# Patient Record
Sex: FEMALE | Race: WHITE | NOT HISPANIC OR LATINO | Employment: STUDENT | URBAN - METROPOLITAN AREA
[De-identification: names, ages, dates, MRNs, and addresses within clinical notes are randomized per-mention and may not be internally consistent; named-entity substitution may affect disease eponyms.]

---

## 2017-01-09 ENCOUNTER — OFFICE VISIT (OUTPATIENT)
Dept: OBSTETRICS AND GYNECOLOGY | Facility: CLINIC | Age: 22
End: 2017-01-09
Payer: COMMERCIAL

## 2017-01-09 VITALS
SYSTOLIC BLOOD PRESSURE: 118 MMHG | WEIGHT: 153 LBS | HEIGHT: 66 IN | BODY MASS INDEX: 24.59 KG/M2 | DIASTOLIC BLOOD PRESSURE: 72 MMHG

## 2017-01-09 DIAGNOSIS — Z01.419 WELL WOMAN EXAM WITH ROUTINE GYNECOLOGICAL EXAM: Primary | ICD-10-CM

## 2017-01-09 PROCEDURE — 99999 PR PBB SHADOW E&M-EST. PATIENT-LVL II: CPT | Mod: PBBFAC,,, | Performed by: OBSTETRICS & GYNECOLOGY

## 2017-01-09 PROCEDURE — 88175 CYTOPATH C/V AUTO FLUID REDO: CPT

## 2017-01-09 PROCEDURE — 99395 PREV VISIT EST AGE 18-39: CPT | Mod: S$GLB,,, | Performed by: OBSTETRICS & GYNECOLOGY

## 2017-01-09 RX ORDER — NORGESTIMATE AND ETHINYL ESTRADIOL 0.25-0.035
1 KIT ORAL DAILY
Qty: 84 TABLET | Refills: 4 | Status: SHIPPED | OUTPATIENT
Start: 2017-01-09 | End: 2018-01-12 | Stop reason: SDUPTHER

## 2017-01-09 RX ORDER — METRONIDAZOLE 500 MG/1
500 TABLET ORAL 3 TIMES DAILY
Qty: 15 TABLET | Refills: 2 | Status: SHIPPED | OUTPATIENT
Start: 2017-01-09 | End: 2018-02-09

## 2017-01-09 NOTE — MR AVS SNAPSHOT
[facility]  [address] [facility]  [address]th [address], [address]  [place] [address]  Phone: [phone]                  Donna Noel   2017 11:00 AM   Office Visit    Description:  Female : 1995   Provider:  Michael A. Wiedemann, MD   Department:  [facility]           Reason for Visit     Well Woman           Diagnoses this Visit        Comments    Well woman exam with routine gynecological exam    -  Primary            To Do List           Goals (5 Years of Data)     None       These Medications        Disp Refills Start End    SPRINTEC, 28, 0.25-35 mg-mcg per tablet 84 tablet 4 2017     Take 1 tablet by mouth once daily. - Oral    [id number] [place] [employer] Ph #: [phone]       metronidazole (FLAGYL) 500 MG tablet 15 tablet 2 2017     Take 1 tablet (500 mg total) by mouth 3 (three) times daily. - Oral    Pharmacy: [id number] [place] [employer] Ph #: [phone]         [facility]     [facility] Care Line -  Assistance  Registered nurses in the [facility] provide clinical advisement, health education, appointment booking, and other advisory services.  Call for this free service at [phone].             Medications           Message regarding Medications     Verify the changes and/or additions to your medication regime listed below are the same as discussed with your clinician today.  If any of these changes or additions are incorrect, please notify your healthcare provider.        START taking these NEW medications        Refills    metronidazole (FLAGYL) 500 MG tablet 2    Sig: Take 1 tablet (500 mg total) by mouth 3 (three) times daily.    Class: Normal    Route: Oral           Verify that the below list of medications is an accurate representation of the medications you are currently taking.  If none reported, the list may be blank. If incorrect, please contact  "your healthcare provider. Carry this list with you in case of emergency.           Current Medications     SPRINTEC, 28, 0.25-35 mg-mcg per tablet Take 1 tablet by mouth once daily.    metronidazole (FLAGYL) 500 MG tablet Take 1 tablet (500 mg total) by mouth 3 (three) times daily.           Clinical Reference Information           Vital Signs - Last Recorded  Most recent update: 1/9/2017 11:09 AM by Odalys Rosenberg LPN    BP Ht Wt LMP BMI    118/72 5' 6" (1.676 m) 69.4 kg (153 lb) 12/13/2016 (Exact Date) 24.69 kg/m2      Blood Pressure          Most Recent Value    BP  118/72      Allergies as of 1/9/2017     No Known Allergies      Immunizations Administered on Date of Encounter - 1/9/2017     None      Orders Placed During Today's Visit      Normal Orders This Visit    Liquid-based pap smear, screening       [facility] Sign-Up     Activating your [facility] account is as easy as 1-2-3!     1) Visit [email or web address], select Sign Up Now, enter this activation code and your date of birth, then select Next.  [email or web address]  Expires: 2/23/2017 11:20 AM      2) Create a username and password to use when you visit [facility] in the future and select a security question in case you lose your password and select Next.    3) Enter your e-mail address and click Sign Up!    Additional Information  If you have questions, please e-mail [email or web address] or call [phone] to talk to our [facility] staff. Remember, [facility] is NOT to be used for urgent needs. For medical emergencies, dial 911.         "

## 2017-01-09 NOTE — PROGRESS NOTES
"HPI:   21 y.o.   OB History      Para Term  AB TAB SAB Ectopic Multiple Living    0 0 0 0 0 0 0 0 0 0       Patient's last menstrual period was 2016 (exact date).    Patient is  here for her annual gynecologic exam.  She has no complaints.     ROS:  GENERAL: No fever, chills, fatigability or weight loss.  SKIN: No rashes, itching or changes in color or texture of skin.  HEAD: No headaches or recent head trauma.  EYES: Visual acuity fine. No photophobia, ocular pain or diplopia.  EARS: Denies ear pain, discharge or vertigo.  NOSE: No loss of smell, no epistaxis or postnasal drip.  MOUTH & THROAT: No hoarseness or change in voice. No excessive gum bleeding.  NODES: Denies swollen glands.  CHEST: Denies NOLAN, cyanosis, wheezing, cough and sputum production.  CARDIOVASCULAR: Denies chest pain, PND, orthopnea or reduced exercise tolerance.  ABDOMEN: Appetite fine. No weight loss. Denies diarrhea, abdominal pain, hematemesis or blood in stool.  URINARY: No flank pain, dysuria or hematuria.  PERIPHERAL VASCULAR: No claudication or cyanosis.  MUSCULOSKELETAL: No joint stiffness or swelling. Denies back pain.  NEUROLOGIC: No history of seizures, paralysis, alteration of gait or coordination.    PE:   Visit Vitals    /72    Ht 5' 6" (1.676 m)    Wt 69.4 kg (153 lb)    LMP 2016 (Exact Date)    BMI 24.69 kg/m2     APPEARANCE: Well nourished, well developed, in no acute distress.  NECK: Neck symmetric without masses or thyromegaly.  BREASTS: Symmetrical, no skin changes or visible lesions. No palpable masses, nipple discharge or adenopathy bilaterally.  ABDOMEN: Flat. Soft. No tenderness or masses. No hepatosplenomegaly. No hernias. No CVA tenderness.  VULVA: No lesions. Normal female genitalia.  URETHRAL MEATUS: Normal size and location, no lesions, no prolapse.  URETHRA: No masses, tenderness, prolapse or scarring.  VAGINA: Moist and well rugated, no discharge, no significant cystocele or " rectocele.  CERVIX: No lesions and discharge. PAP done.  UTERUS: Normal size, regular shape, mobile, non-tender, bladder base nontender.  ADNEXA: No masses, tenderness or CDS nodularity.  ANUS PERINEUM: Normal.    PROCEDURES:  Pap smear    Assessment:  Normal Gynecologic Exam    Plan:  Mammogram and Colonoscopy if indicated by current recommendations.  Return to clinic in one year or for any problems or complaints.  refil ocp  Trial flagyl for co dc, but always

## 2017-01-24 ENCOUNTER — TELEPHONE (OUTPATIENT)
Dept: OBSTETRICS AND GYNECOLOGY | Facility: CLINIC | Age: 22
End: 2017-01-24

## 2017-01-24 NOTE — TELEPHONE ENCOUNTER
Returned patients call.   Patients questions regarding taking flagyl, and the side effects of GI upset were answered and patient verbalized understanding.

## 2017-01-24 NOTE — TELEPHONE ENCOUNTER
----- Message from Angelic Humphrey sent at 1/24/2017 10:02 AM CST -----  Contact: Self [phone]  Patient is calling to talk to nurse concerning questions on the medication she is taking. Please advice

## 2017-05-11 ENCOUNTER — TELEPHONE (OUTPATIENT)
Dept: OBSTETRICS AND GYNECOLOGY | Facility: CLINIC | Age: 22
End: 2017-05-11

## 2017-05-11 RX ORDER — NAPROXEN SODIUM 550 MG/1
550 TABLET ORAL
Qty: 30 TABLET | Refills: 0 | Status: SHIPPED | OUTPATIENT
Start: 2017-05-11 | End: 2018-02-09

## 2017-05-11 NOTE — TELEPHONE ENCOUNTER
----- Message from Raisa Broussard sent at 5/11/2017 10:50 AM CDT -----  Contact: Self/[phone]  Patient said she would like to speak with you regarding painful cramp like pains. She has not had a period because she is on birth control. Please advise

## 2017-05-11 NOTE — TELEPHONE ENCOUNTER
----- Message from Lilliam Ramesh sent at 5/11/2017 11:31 AM CDT -----  No. [phone]   Patient returned your call.

## 2017-05-11 NOTE — TELEPHONE ENCOUNTER
----- Message from Dianna Amaya sent at 5/11/2017  3:25 PM CDT -----  Contact: [phone]/self  Patient is returning your call. Please advise

## 2017-05-11 NOTE — TELEPHONE ENCOUNTER
Pt says she started her period on Wednesday and she is having a lot of pain that she doesn't typically experience. Pt took ibuprofen and used a heating pad but it didn't help much. She says the flow is less than normal. I advised her that it sounds like a bad cycle and to let us know if the pain doesn't go away or if it returns with next months cycle. She would like a Rx for anaprox

## 2017-06-21 RX ORDER — CIPROFLOXACIN 500 MG/1
500 TABLET ORAL 2 TIMES DAILY
Qty: 10 TABLET | Refills: 0 | Status: SHIPPED | OUTPATIENT
Start: 2017-06-21 | End: 2017-06-26

## 2018-01-12 NOTE — TELEPHONE ENCOUNTER
----- Message from Michelle Mai sent at 1/12/2018  2:30 PM CST -----  Contact: [phone]/self  Patient requesting to speak with you regarding a refill for her birth control medication. Please call and advise.

## 2018-01-14 RX ORDER — NORGESTIMATE AND ETHINYL ESTRADIOL 0.25-0.035
1 KIT ORAL DAILY
Qty: 84 TABLET | Refills: 0 | Status: SHIPPED | OUTPATIENT
Start: 2018-01-14 | End: 2018-02-09 | Stop reason: SDUPTHER

## 2018-02-09 ENCOUNTER — OFFICE VISIT (OUTPATIENT)
Dept: OBSTETRICS AND GYNECOLOGY | Facility: CLINIC | Age: 23
End: 2018-02-09
Payer: COMMERCIAL

## 2018-02-09 VITALS
DIASTOLIC BLOOD PRESSURE: 80 MMHG | WEIGHT: 156.06 LBS | BODY MASS INDEX: 25.19 KG/M2 | SYSTOLIC BLOOD PRESSURE: 100 MMHG

## 2018-02-09 DIAGNOSIS — Z01.419 WELL WOMAN EXAM WITH ROUTINE GYNECOLOGICAL EXAM: Primary | ICD-10-CM

## 2018-02-09 DIAGNOSIS — Z12.4 ROUTINE PAPANICOLAOU SMEAR: ICD-10-CM

## 2018-02-09 PROCEDURE — 99395 PREV VISIT EST AGE 18-39: CPT | Mod: S$GLB,,, | Performed by: OBSTETRICS & GYNECOLOGY

## 2018-02-09 PROCEDURE — 88175 CYTOPATH C/V AUTO FLUID REDO: CPT

## 2018-02-09 PROCEDURE — 99999 PR PBB SHADOW E&M-EST. PATIENT-LVL II: CPT | Mod: PBBFAC,,, | Performed by: OBSTETRICS & GYNECOLOGY

## 2018-02-09 RX ORDER — NORGESTIMATE AND ETHINYL ESTRADIOL 0.25-0.035
1 KIT ORAL DAILY
Qty: 84 TABLET | Refills: 3 | Status: SHIPPED | OUTPATIENT
Start: 2018-02-09 | End: 2018-12-04 | Stop reason: SDUPTHER

## 2018-02-09 NOTE — PROGRESS NOTES
HPI:   22 y.o.   OB History      Para Term  AB Living    0 0 0 0 0 0    SAB TAB Ectopic Multiple Live Births    0 0 0 0         Patient's last menstrual period was 2018 (exact date).    Patient is  here for her annual gynecologic exam.  She has no complaints.     ROS:  GENERAL: No fever, chills, fatigability or weight loss.  SKIN: No rashes, itching or changes in color or texture of skin.  HEAD: No headaches or recent head trauma.  EYES: Visual acuity fine. No photophobia, ocular pain or diplopia.  EARS: Denies ear pain, discharge or vertigo.  NOSE: No loss of smell, no epistaxis or postnasal drip.  MOUTH & THROAT: No hoarseness or change in voice. No excessive gum bleeding.  NODES: Denies swollen glands.  CHEST: Denies NOLAN, cyanosis, wheezing, cough and sputum production.  CARDIOVASCULAR: Denies chest pain, PND, orthopnea or reduced exercise tolerance.  ABDOMEN: Appetite fine. No weight loss. Denies diarrhea, abdominal pain, hematemesis or blood in stool.  URINARY: No flank pain, dysuria or hematuria.  PERIPHERAL VASCULAR: No claudication or cyanosis.  MUSCULOSKELETAL: No joint stiffness or swelling. Denies back pain.  NEUROLOGIC: No history of seizures, paralysis, alteration of gait or coordination.    PE:   /80   Wt 70.8 kg (156 lb 1.4 oz)   LMP 2018 (Exact Date)   BMI 25.19 kg/m²   APPEARANCE: Well nourished, well developed, in no acute distress.  NECK: Neck symmetric without masses or thyromegaly.  BREASTS: Symmetrical, no skin changes or visible lesions. No palpable masses, nipple discharge or adenopathy bilaterally.  ABDOMEN: Flat. Soft. No tenderness or masses. No hepatosplenomegaly. No hernias. No CVA tenderness.  VULVA: No lesions. Normal female genitalia.  URETHRAL MEATUS: Normal size and location, no lesions, no prolapse.  URETHRA: No masses, tenderness, prolapse or scarring.  VAGINA: Moist and well rugated, no discharge, no significant cystocele or rectocele.  CERVIX:  No lesions and discharge. PAP done.  UTERUS: Normal size, regular shape, mobile, non-tender, bladder base nontender.  ADNEXA: No masses, tenderness or CDS nodularity.  ANUS PERINEUM: Normal.    PROCEDURES:  Pap smear    Assessment:  Normal Gynecologic Exam    Plan:  Mammogram and Colonoscopy if indicated by current recommendations.  Return to clinic in one year or for any problems or complaints.  Doing well on ocp

## 2018-12-04 RX ORDER — NORGESTIMATE AND ETHINYL ESTRADIOL 0.25-0.035
1 KIT ORAL DAILY
Qty: 84 TABLET | Refills: 0 | Status: SHIPPED | OUTPATIENT
Start: 2018-12-04 | End: 2023-10-18 | Stop reason: SDUPTHER

## 2019-01-31 RX ORDER — NORGESTIMATE AND ETHINYL ESTRADIOL 0.25-0.035
KIT ORAL
Qty: 84 TABLET | Refills: 0 | Status: SHIPPED | OUTPATIENT
Start: 2019-01-31 | End: 2019-03-28 | Stop reason: SDUPTHER

## 2019-03-12 ENCOUNTER — TELEPHONE (OUTPATIENT)
Dept: OBSTETRICS AND GYNECOLOGY | Facility: CLINIC | Age: 24
End: 2019-03-12

## 2019-03-12 NOTE — TELEPHONE ENCOUNTER
----- Message from Lilliam Ramesh sent at 3/12/2019  9:16 AM CDT -----  No. [phone]    Patient has an appointment this morning at 11:00.  She is on her cycle, but it is light.   Can she still come.     Please call.

## 2019-03-28 ENCOUNTER — OFFICE VISIT (OUTPATIENT)
Dept: OBSTETRICS AND GYNECOLOGY | Facility: CLINIC | Age: 24
End: 2019-03-28
Payer: COMMERCIAL

## 2019-03-28 VITALS
HEIGHT: 66 IN | BODY MASS INDEX: 25.08 KG/M2 | DIASTOLIC BLOOD PRESSURE: 74 MMHG | WEIGHT: 156.06 LBS | SYSTOLIC BLOOD PRESSURE: 108 MMHG

## 2019-03-28 DIAGNOSIS — Z12.4 ROUTINE PAPANICOLAOU SMEAR: Primary | ICD-10-CM

## 2019-03-28 DIAGNOSIS — Z01.419 WELL WOMAN EXAM WITH ROUTINE GYNECOLOGICAL EXAM: ICD-10-CM

## 2019-03-28 PROCEDURE — 99395 PR PREVENTIVE VISIT,EST,18-39: ICD-10-PCS | Mod: S$GLB,,, | Performed by: OBSTETRICS & GYNECOLOGY

## 2019-03-28 PROCEDURE — 99999 PR PBB SHADOW E&M-EST. PATIENT-LVL III: CPT | Mod: PBBFAC,,, | Performed by: OBSTETRICS & GYNECOLOGY

## 2019-03-28 PROCEDURE — 99999 PR PBB SHADOW E&M-EST. PATIENT-LVL III: ICD-10-PCS | Mod: PBBFAC,,, | Performed by: OBSTETRICS & GYNECOLOGY

## 2019-03-28 PROCEDURE — 99395 PREV VISIT EST AGE 18-39: CPT | Mod: S$GLB,,, | Performed by: OBSTETRICS & GYNECOLOGY

## 2019-03-28 PROCEDURE — 88175 CYTOPATH C/V AUTO FLUID REDO: CPT

## 2019-03-28 NOTE — PROGRESS NOTES
"HPI:   23 y.o.   OB History        0    Para   0    Term   0       0    AB   0    Living   0       SAB   0    TAB   0    Ectopic   0    Multiple   0    Live Births                  Patient's last menstrual period was 2019.    Patient is  here for her annual gynecologic exam.  She has no complaints.     ROS:  GENERAL: No fever, chills, fatigability or weight loss.  SKIN: No rashes, itching or changes in color or texture of skin.  HEAD: No headaches or recent head trauma.  EYES: Visual acuity fine. No photophobia, ocular pain or diplopia.  EARS: Denies ear pain, discharge or vertigo.  NOSE: No loss of smell, no epistaxis or postnasal drip.  MOUTH & THROAT: No hoarseness or change in voice. No excessive gum bleeding.  NODES: Denies swollen glands.  CHEST: Denies NOLAN, cyanosis, wheezing, cough and sputum production.  CARDIOVASCULAR: Denies chest pain, PND, orthopnea or reduced exercise tolerance.  ABDOMEN: Appetite fine. No weight loss. Denies diarrhea, abdominal pain, hematemesis or blood in stool.  URINARY: No flank pain, dysuria or hematuria.  PERIPHERAL VASCULAR: No claudication or cyanosis.  MUSCULOSKELETAL: No joint stiffness or swelling. Denies back pain.  NEUROLOGIC: No history of seizures, paralysis, alteration of gait or coordination.    PE:   /74   Ht 5' 6" (1.676 m)   Wt 70.8 kg (156 lb 1.4 oz)   LMP 2019   BMI 25.19 kg/m²   APPEARANCE: Well nourished, well developed, in no acute distress.  NECK: Neck symmetric without masses or thyromegaly.  BREASTS: Symmetrical, no skin changes or visible lesions. No palpable masses, nipple discharge or adenopathy bilaterally.  ABDOMEN: Flat. Soft. No tenderness or masses. No hepatosplenomegaly. No hernias. No CVA tenderness.  VULVA: No lesions. Normal female genitalia.  URETHRAL MEATUS: Normal size and location, no lesions, no prolapse.  URETHRA: No masses, tenderness, prolapse or scarring.  VAGINA: Moist and well rugated, no discharge, " no significant cystocele or rectocele.  CERVIX: No lesions and discharge. PAP done.  UTERUS: Normal size, regular shape, mobile, non-tender, bladder base nontender.  ADNEXA: No masses, tenderness or CDS nodularity.  ANUS PERINEUM: Normal.    PROCEDURES:  Pap smear    Assessment:  Normal Gynecologic Exam    Plan:  Mammogram and Colonoscopy if indicated by current recommendations.  Return to clinic in one year or for any problems or complaints.  No co  ocp

## 2019-05-02 RX ORDER — NORGESTIMATE AND ETHINYL ESTRADIOL 0.25-0.035
KIT ORAL
Qty: 84 TABLET | Refills: 11 | Status: SHIPPED | OUTPATIENT
Start: 2019-05-02 | End: 2023-10-18 | Stop reason: SDUPTHER

## 2020-02-28 NOTE — TELEPHONE ENCOUNTER
Pt state she is having a smell coming from her vagina with a little discharge but no itching. Pt state when she urinate she can smell it and when she showers. Pt wants to know if this is a yeast.

## 2020-03-02 RX ORDER — METRONIDAZOLE 500 MG/1
500 TABLET ORAL 3 TIMES DAILY
Qty: 15 TABLET | Refills: 0 | Status: SHIPPED | OUTPATIENT
Start: 2020-03-02 | End: 2020-03-07

## 2020-05-27 RX ORDER — NORGESTIMATE AND ETHINYL ESTRADIOL 0.25-0.035
KIT ORAL
Qty: 84 TABLET | Refills: 0 | Status: SHIPPED | OUTPATIENT
Start: 2020-05-27 | End: 2021-01-31

## 2021-01-22 RX ORDER — CIPROFLOXACIN 500 MG/1
500 TABLET ORAL 2 TIMES DAILY
Qty: 10 TABLET | Refills: 0 | Status: SHIPPED | OUTPATIENT
Start: 2021-01-22 | End: 2021-01-27

## 2021-05-06 ENCOUNTER — PATIENT MESSAGE (OUTPATIENT)
Dept: RESEARCH | Facility: HOSPITAL | Age: 26
End: 2021-05-06

## 2021-08-18 ENCOUNTER — PATIENT MESSAGE (OUTPATIENT)
Dept: OBSTETRICS AND GYNECOLOGY | Facility: CLINIC | Age: 26
End: 2021-08-18

## 2021-08-23 ENCOUNTER — LAB VISIT (OUTPATIENT)
Dept: PRIMARY CARE CLINIC | Facility: OTHER | Age: 26
End: 2021-08-23
Payer: COMMERCIAL

## 2021-08-23 DIAGNOSIS — R05.9 COUGH: ICD-10-CM

## 2021-08-23 PROCEDURE — U0003 INFECTIOUS AGENT DETECTION BY NUCLEIC ACID (DNA OR RNA); SEVERE ACUTE RESPIRATORY SYNDROME CORONAVIRUS 2 (SARS-COV-2) (CORONAVIRUS DISEASE [COVID-19]), AMPLIFIED PROBE TECHNIQUE, MAKING USE OF HIGH THROUGHPUT TECHNOLOGIES AS DESCRIBED BY CMS-2020-01-R: HCPCS | Performed by: NURSE PRACTITIONER

## 2021-08-24 ENCOUNTER — OFFICE VISIT (OUTPATIENT)
Dept: OBSTETRICS AND GYNECOLOGY | Facility: CLINIC | Age: 26
End: 2021-08-24
Payer: COMMERCIAL

## 2021-08-24 VITALS — SYSTOLIC BLOOD PRESSURE: 120 MMHG | WEIGHT: 162.5 LBS | DIASTOLIC BLOOD PRESSURE: 70 MMHG | BODY MASS INDEX: 26.22 KG/M2

## 2021-08-24 DIAGNOSIS — Z01.419 WELL WOMAN EXAM WITH ROUTINE GYNECOLOGICAL EXAM: Primary | ICD-10-CM

## 2021-08-24 PROCEDURE — 99395 PR PREVENTIVE VISIT,EST,18-39: ICD-10-PCS | Mod: S$GLB,,, | Performed by: OBSTETRICS & GYNECOLOGY

## 2021-08-24 PROCEDURE — 87624 HPV HI-RISK TYP POOLED RSLT: CPT | Performed by: OBSTETRICS & GYNECOLOGY

## 2021-08-24 PROCEDURE — 88175 CYTOPATH C/V AUTO FLUID REDO: CPT | Performed by: OBSTETRICS & GYNECOLOGY

## 2021-08-24 PROCEDURE — 99999 PR PBB SHADOW E&M-EST. PATIENT-LVL II: CPT | Mod: PBBFAC,,, | Performed by: OBSTETRICS & GYNECOLOGY

## 2021-08-24 PROCEDURE — 3008F BODY MASS INDEX DOCD: CPT | Mod: CPTII,S$GLB,, | Performed by: OBSTETRICS & GYNECOLOGY

## 2021-08-24 PROCEDURE — 1160F PR REVIEW ALL MEDS BY PRESCRIBER/CLIN PHARMACIST DOCUMENTED: ICD-10-PCS | Mod: CPTII,S$GLB,, | Performed by: OBSTETRICS & GYNECOLOGY

## 2021-08-24 PROCEDURE — 99395 PREV VISIT EST AGE 18-39: CPT | Mod: S$GLB,,, | Performed by: OBSTETRICS & GYNECOLOGY

## 2021-08-24 PROCEDURE — 1159F PR MEDICATION LIST DOCUMENTED IN MEDICAL RECORD: ICD-10-PCS | Mod: CPTII,S$GLB,, | Performed by: OBSTETRICS & GYNECOLOGY

## 2021-08-24 PROCEDURE — 3074F PR MOST RECENT SYSTOLIC BLOOD PRESSURE < 130 MM HG: ICD-10-PCS | Mod: CPTII,S$GLB,, | Performed by: OBSTETRICS & GYNECOLOGY

## 2021-08-24 PROCEDURE — 1126F AMNT PAIN NOTED NONE PRSNT: CPT | Mod: CPTII,S$GLB,, | Performed by: OBSTETRICS & GYNECOLOGY

## 2021-08-24 PROCEDURE — 1159F MED LIST DOCD IN RCRD: CPT | Mod: CPTII,S$GLB,, | Performed by: OBSTETRICS & GYNECOLOGY

## 2021-08-24 PROCEDURE — 1160F RVW MEDS BY RX/DR IN RCRD: CPT | Mod: CPTII,S$GLB,, | Performed by: OBSTETRICS & GYNECOLOGY

## 2021-08-24 PROCEDURE — 3074F SYST BP LT 130 MM HG: CPT | Mod: CPTII,S$GLB,, | Performed by: OBSTETRICS & GYNECOLOGY

## 2021-08-24 PROCEDURE — 87625 HPV TYPES 16 & 18 ONLY: CPT | Mod: 59 | Performed by: OBSTETRICS & GYNECOLOGY

## 2021-08-24 PROCEDURE — 1126F PR PAIN SEVERITY QUANTIFIED, NO PAIN PRESENT: ICD-10-PCS | Mod: CPTII,S$GLB,, | Performed by: OBSTETRICS & GYNECOLOGY

## 2021-08-24 PROCEDURE — 3008F PR BODY MASS INDEX (BMI) DOCUMENTED: ICD-10-PCS | Mod: CPTII,S$GLB,, | Performed by: OBSTETRICS & GYNECOLOGY

## 2021-08-24 PROCEDURE — 3078F PR MOST RECENT DIASTOLIC BLOOD PRESSURE < 80 MM HG: ICD-10-PCS | Mod: CPTII,S$GLB,, | Performed by: OBSTETRICS & GYNECOLOGY

## 2021-08-24 PROCEDURE — 99999 PR PBB SHADOW E&M-EST. PATIENT-LVL II: ICD-10-PCS | Mod: PBBFAC,,, | Performed by: OBSTETRICS & GYNECOLOGY

## 2021-08-24 PROCEDURE — 3078F DIAST BP <80 MM HG: CPT | Mod: CPTII,S$GLB,, | Performed by: OBSTETRICS & GYNECOLOGY

## 2021-08-24 RX ORDER — NORGESTIMATE AND ETHINYL ESTRADIOL 0.25-0.035
1 KIT ORAL DAILY
Qty: 28 TABLET | Refills: 11 | Status: SHIPPED | OUTPATIENT
Start: 2021-08-24 | End: 2022-08-24

## 2021-08-25 LAB
SARS-COV-2 RNA RESP QL NAA+PROBE: NOT DETECTED
SARS-COV-2- CYCLE NUMBER: NORMAL

## 2021-09-08 LAB
CLINICAL INFO: ABNORMAL
CYTO CVX: ABNORMAL
CYTOLOGIST CVX/VAG CYTO: ABNORMAL
CYTOLOGIST CVX/VAG CYTO: ABNORMAL
CYTOLOGY CMNT CVX/VAG CYTO-IMP: ABNORMAL
CYTOLOGY PAP THIN PREP EXPLANATION: ABNORMAL
DATE OF PREVIOUS PAP: ABNORMAL
DATE PREVIOUS BX: NO
GEN CATEG CVX/VAG CYTO-IMP: ABNORMAL
HPV I/H RISK 4 DNA CVX QL NAA+PROBE: DETECTED
HPV16 DNA CVX QL PROBE+SIG AMP: NOT DETECTED
HPV18 DNA CVX QL PROBE+SIG AMP: NOT DETECTED
LMP START DATE: ABNORMAL
MICROORGANISM CVX/VAG CYTO: ABNORMAL
PATHOLOGIST CVX/VAG CYTO: ABNORMAL
SERVICE CMNT-IMP: ABNORMAL
SPECIMEN SOURCE CVX/VAG CYTO: ABNORMAL
STAT OF ADQ CVX/VAG CYTO-IMP: ABNORMAL

## 2021-09-13 ENCOUNTER — TELEPHONE (OUTPATIENT)
Dept: OBSTETRICS AND GYNECOLOGY | Facility: CLINIC | Age: 26
End: 2021-09-13

## 2022-06-16 ENCOUNTER — OFFICE VISIT (OUTPATIENT)
Dept: OBSTETRICS AND GYNECOLOGY | Facility: CLINIC | Age: 27
End: 2022-06-16
Payer: COMMERCIAL

## 2022-06-16 VITALS — WEIGHT: 159 LBS | SYSTOLIC BLOOD PRESSURE: 120 MMHG | BODY MASS INDEX: 25.66 KG/M2 | DIASTOLIC BLOOD PRESSURE: 60 MMHG

## 2022-06-16 DIAGNOSIS — Z01.419 WELL WOMAN EXAM WITH ROUTINE GYNECOLOGICAL EXAM: Primary | ICD-10-CM

## 2022-06-16 PROCEDURE — 3078F PR MOST RECENT DIASTOLIC BLOOD PRESSURE < 80 MM HG: ICD-10-PCS | Mod: CPTII,S$GLB,, | Performed by: OBSTETRICS & GYNECOLOGY

## 2022-06-16 PROCEDURE — 3008F PR BODY MASS INDEX (BMI) DOCUMENTED: ICD-10-PCS | Mod: CPTII,S$GLB,, | Performed by: OBSTETRICS & GYNECOLOGY

## 2022-06-16 PROCEDURE — 99213 PR OFFICE/OUTPT VISIT, EST, LEVL III, 20-29 MIN: ICD-10-PCS | Mod: S$GLB,,, | Performed by: OBSTETRICS & GYNECOLOGY

## 2022-06-16 PROCEDURE — 99999 PR PBB SHADOW E&M-EST. PATIENT-LVL III: CPT | Mod: PBBFAC,,, | Performed by: OBSTETRICS & GYNECOLOGY

## 2022-06-16 PROCEDURE — 88141 PR  CYTOPATH CERV/VAG INTERPRET: ICD-10-PCS | Mod: ,,, | Performed by: PATHOLOGY

## 2022-06-16 PROCEDURE — 1159F PR MEDICATION LIST DOCUMENTED IN MEDICAL RECORD: ICD-10-PCS | Mod: CPTII,S$GLB,, | Performed by: OBSTETRICS & GYNECOLOGY

## 2022-06-16 PROCEDURE — 3008F BODY MASS INDEX DOCD: CPT | Mod: CPTII,S$GLB,, | Performed by: OBSTETRICS & GYNECOLOGY

## 2022-06-16 PROCEDURE — 3074F PR MOST RECENT SYSTOLIC BLOOD PRESSURE < 130 MM HG: ICD-10-PCS | Mod: CPTII,S$GLB,, | Performed by: OBSTETRICS & GYNECOLOGY

## 2022-06-16 PROCEDURE — 99213 OFFICE O/P EST LOW 20 MIN: CPT | Mod: S$GLB,,, | Performed by: OBSTETRICS & GYNECOLOGY

## 2022-06-16 PROCEDURE — 1159F MED LIST DOCD IN RCRD: CPT | Mod: CPTII,S$GLB,, | Performed by: OBSTETRICS & GYNECOLOGY

## 2022-06-16 PROCEDURE — 1160F PR REVIEW ALL MEDS BY PRESCRIBER/CLIN PHARMACIST DOCUMENTED: ICD-10-PCS | Mod: CPTII,S$GLB,, | Performed by: OBSTETRICS & GYNECOLOGY

## 2022-06-16 PROCEDURE — 88141 CYTOPATH C/V INTERPRET: CPT | Mod: ,,, | Performed by: PATHOLOGY

## 2022-06-16 PROCEDURE — 99999 PR PBB SHADOW E&M-EST. PATIENT-LVL III: ICD-10-PCS | Mod: PBBFAC,,, | Performed by: OBSTETRICS & GYNECOLOGY

## 2022-06-16 PROCEDURE — 1160F RVW MEDS BY RX/DR IN RCRD: CPT | Mod: CPTII,S$GLB,, | Performed by: OBSTETRICS & GYNECOLOGY

## 2022-06-16 PROCEDURE — 3078F DIAST BP <80 MM HG: CPT | Mod: CPTII,S$GLB,, | Performed by: OBSTETRICS & GYNECOLOGY

## 2022-06-16 PROCEDURE — 88175 CYTOPATH C/V AUTO FLUID REDO: CPT | Performed by: PATHOLOGY

## 2022-06-16 PROCEDURE — 3074F SYST BP LT 130 MM HG: CPT | Mod: CPTII,S$GLB,, | Performed by: OBSTETRICS & GYNECOLOGY

## 2022-06-16 RX ORDER — AMOXICILLIN 500 MG/1
500 CAPSULE ORAL 3 TIMES DAILY
COMMUNITY
Start: 2022-03-22

## 2022-06-17 NOTE — PROGRESS NOTES
26 y.o.   OB History        0    Para   0    Term   0       0    AB   0    Living   0       SAB   0    IAB   0    Ectopic   0    Multiple   0    Live Births                   Comlaining of:  Rpt pap, atypical  No issues  On ocp, doing well      ROS:  GENERAL: No fever, chills, fatigability or weight loss.  SKIN: No rashes, itching or changes in color or texture of skin.  HEAD: No headaches or recent head trauma.  EYES: Visual acuity fine. No photophobia, ocular pain or diplopia.  EARS: Denies ear pain, discharge or vertigo.  NOSE: No loss of smell, no epistaxis or postnasal drip.  MOUTH & THROAT: No hoarseness or change in voice. No excessive gum bleeding.  NODES: Denies swollen glands.  CHEST: Denies NOLAN, cyanosis, wheezing, cough and sputum production.  CARDIOVASCULAR: Denies chest pain, PND, orthopnea or reduced exercise tolerance.  ABDOMEN: Appetite fine. No weight loss. Denies diarrhea, abdominal pain, hematemesis or blood in stool.  URINARY: No flank pain, dysuria or hematuria.  PERIPHERAL VASCULAR: No claudication or cyanosis.  MUSCULOSKELETAL: No joint stiffness or swelling. Denies back pain.  NEUROLOGIC: No history of seizures, paralysis, alteration of gait or coordination      PE: /60   Wt 72.1 kg (159 lb)   LMP 2022 (Approximate)   BMI 25.66 kg/m²      Abd soft  cx normal oziel  papg done    A /plan, fu after pap

## 2022-06-24 LAB
FINAL PATHOLOGIC DIAGNOSIS: ABNORMAL
Lab: ABNORMAL

## 2022-06-25 ENCOUNTER — TELEPHONE (OUTPATIENT)
Dept: OBSTETRICS AND GYNECOLOGY | Facility: CLINIC | Age: 27
End: 2022-06-25
Payer: COMMERCIAL

## 2023-03-15 ENCOUNTER — TELEPHONE (OUTPATIENT)
Dept: OBSTETRICS AND GYNECOLOGY | Facility: CLINIC | Age: 28
End: 2023-03-15
Payer: COMMERCIAL

## 2023-03-15 NOTE — TELEPHONE ENCOUNTER
DAFNEM informing patient to give us a call back to reschedule appointment scheduled on March 24.  Will send patient a [employer] message as well.    NEHEMIAH Cui

## 2023-07-14 RX ORDER — NORGESTIMATE AND ETHINYL ESTRADIOL 0.25-0.035
KIT ORAL
Qty: 28 TABLET | Refills: 3 | Status: SHIPPED | OUTPATIENT
Start: 2023-07-14 | End: 2023-09-19 | Stop reason: SDUPTHER

## 2023-07-14 NOTE — TELEPHONE ENCOUNTER
Please see the attached refill request. Patient's last annual appointment was on 06/16/22. Patient was scheduled for annual appointment on 03/24/23 and the appointment had to be rescheduled. Patient's next annual appointment is 07/20/23.

## 2023-07-20 ENCOUNTER — OFFICE VISIT (OUTPATIENT)
Dept: OBSTETRICS AND GYNECOLOGY | Facility: CLINIC | Age: 28
End: 2023-07-20
Payer: COMMERCIAL

## 2023-07-20 VITALS
DIASTOLIC BLOOD PRESSURE: 73 MMHG | WEIGHT: 165.13 LBS | SYSTOLIC BLOOD PRESSURE: 120 MMHG | BODY MASS INDEX: 26.65 KG/M2

## 2023-07-20 DIAGNOSIS — Z01.419 WELL WOMAN EXAM WITH ROUTINE GYNECOLOGICAL EXAM: Primary | ICD-10-CM

## 2023-07-20 DIAGNOSIS — Z30.09 ENCOUNTER FOR COUNSELING REGARDING CONTRACEPTION: ICD-10-CM

## 2023-07-20 PROCEDURE — 3074F PR MOST RECENT SYSTOLIC BLOOD PRESSURE < 130 MM HG: ICD-10-PCS | Mod: CPTII,S$GLB,, | Performed by: STUDENT IN AN ORGANIZED HEALTH CARE EDUCATION/TRAINING PROGRAM

## 2023-07-20 PROCEDURE — 3078F DIAST BP <80 MM HG: CPT | Mod: CPTII,S$GLB,, | Performed by: STUDENT IN AN ORGANIZED HEALTH CARE EDUCATION/TRAINING PROGRAM

## 2023-07-20 PROCEDURE — 1159F MED LIST DOCD IN RCRD: CPT | Mod: CPTII,S$GLB,, | Performed by: STUDENT IN AN ORGANIZED HEALTH CARE EDUCATION/TRAINING PROGRAM

## 2023-07-20 PROCEDURE — 3078F PR MOST RECENT DIASTOLIC BLOOD PRESSURE < 80 MM HG: ICD-10-PCS | Mod: CPTII,S$GLB,, | Performed by: STUDENT IN AN ORGANIZED HEALTH CARE EDUCATION/TRAINING PROGRAM

## 2023-07-20 PROCEDURE — 3008F BODY MASS INDEX DOCD: CPT | Mod: CPTII,S$GLB,, | Performed by: STUDENT IN AN ORGANIZED HEALTH CARE EDUCATION/TRAINING PROGRAM

## 2023-07-20 PROCEDURE — 3008F PR BODY MASS INDEX (BMI) DOCUMENTED: ICD-10-PCS | Mod: CPTII,S$GLB,, | Performed by: STUDENT IN AN ORGANIZED HEALTH CARE EDUCATION/TRAINING PROGRAM

## 2023-07-20 PROCEDURE — 99999 PR PBB SHADOW E&M-EST. PATIENT-LVL III: ICD-10-PCS | Mod: PBBFAC,,, | Performed by: STUDENT IN AN ORGANIZED HEALTH CARE EDUCATION/TRAINING PROGRAM

## 2023-07-20 PROCEDURE — 99999 PR PBB SHADOW E&M-EST. PATIENT-LVL III: CPT | Mod: PBBFAC,,, | Performed by: STUDENT IN AN ORGANIZED HEALTH CARE EDUCATION/TRAINING PROGRAM

## 2023-07-20 PROCEDURE — 1159F PR MEDICATION LIST DOCUMENTED IN MEDICAL RECORD: ICD-10-PCS | Mod: CPTII,S$GLB,, | Performed by: STUDENT IN AN ORGANIZED HEALTH CARE EDUCATION/TRAINING PROGRAM

## 2023-07-20 PROCEDURE — 99395 PR PREVENTIVE VISIT,EST,18-39: ICD-10-PCS | Mod: S$GLB,,, | Performed by: STUDENT IN AN ORGANIZED HEALTH CARE EDUCATION/TRAINING PROGRAM

## 2023-07-20 PROCEDURE — 3074F SYST BP LT 130 MM HG: CPT | Mod: CPTII,S$GLB,, | Performed by: STUDENT IN AN ORGANIZED HEALTH CARE EDUCATION/TRAINING PROGRAM

## 2023-07-20 PROCEDURE — 99395 PREV VISIT EST AGE 18-39: CPT | Mod: S$GLB,,, | Performed by: STUDENT IN AN ORGANIZED HEALTH CARE EDUCATION/TRAINING PROGRAM

## 2023-07-21 NOTE — PROGRESS NOTES
CC: Well woman exam    HPI:  Donna Noel is a 27 y.o. female  presents for a well woman exam.  She has no issues, problems, or complaints. Has been on OCPs for 10 years, is interested in IUD for contraception in future.       Patient history:   History reviewed. No pertinent past medical history.  History reviewed. No pertinent surgical history.  OB History    Para Term  AB Living   0 0 0 0 0 0   SAB IAB Ectopic Multiple Live Births   0 0 0 0         GYN  Menopausal: No  History of abnormal paps:  ASCUS  Abnormal or postmenopausal bleeding: DENIES  History of abnormal mammograms:N/A   Family history of breast or ovarian cancer:  yes  Any breast masses, pain, skin changes, or nipple discharge: noticed right breast   Possible recent STD exposure: denies  Contraception: OCPs    Pap: 2022,  unsatisfactory due to low specimen amount after processing   Mammogram: N/A      Family History   Problem Relation Age of Onset    Cancer Maternal Grandfather     Breast cancer Paternal Aunt      Social History     Tobacco Use    Smoking status: Never    Smokeless tobacco: Never   Substance Use Topics    Alcohol use: Yes    Drug use: No     Allergies: Patient has no known allergies.    Current Outpatient Medications:     amoxicillin (AMOXIL) 500 MG capsule, Take 500 mg by mouth 3 (three) times daily., Disp: , Rfl:     norgestimate-ethinyl estradioL (ESTARYLLA) 0.25-35 mg-mcg per tablet, TAKE 1 TABLET BY MOUTH ONCE DAILY, Disp: 28 tablet, Rfl: 3    norgestimate-ethinyl estradioL (MELODY) 0.25-35 mg-mcg per tablet, Take 1 tablet by mouth once daily., Disp: 84 tablet, Rfl: 0    norgestimate-ethinyl estradioL (MELODY) 0.25-35 mg-mcg per tablet, Take 1 tablet by mouth once daily., Disp: 28 tablet, Rfl: 6    norgestimate-ethinyl estradiol (ORTHO-CYCLEN) 0.25-35 mg-mcg per tablet, TAKE 1 TABLET BY MOUTH ONCE DAILY, Disp: 84 tablet, Rfl: 0    norgestimate-ethinyl estradiol (ORTHO-CYCLEN) 0.25-35 mg-mcg per tablet, TAKE  1 TABLET BY MOUTH ONCE DAILY (Patient not taking: Reported on 8/24/2021), Disp: 84 tablet, Rfl: 11    SPRINTEC, 28, 0.25-35 mg-mcg per tablet, TAKE 1 TABLET BY MOUTH ONCE DAILY  (Patient not taking: Reported on 8/24/2021), Disp: 84 tablet, Rfl: 0       ROS:  GENERAL: Denies weight gain or weight loss. Feeling well overall.   SKIN: Denies rash or lesions.   HEAD: Denies head injury or headache.   NODES: Denies enlarged lymph nodes.   CHEST: Denies chest pain or shortness of breath.   CARDIOVASCULAR: Denies palpitations or left sided chest pain.   ABDOMEN: No abdominal pain, constipation, diarrhea, nausea, vomiting or rectal bleeding.   URINARY: No frequency, dysuria, hematuria, or burning on urination.  REPRODUCTIVE: See HPI.   BREASTS: The patient performs breast self-examination and denies pain, lumps, or nipple discharge.   HEMATOLOGIC: No easy bruisability or excessive bleeding.  MUSCULOSKELETAL: Denies joint pain or swelling.   NEUROLOGIC: Denies syncope or weakness.   PSYCHIATRIC: Denies depression, anxiety or mood swings.    Objective:   /73   Wt 74.9 kg (165 lb 2 oz)   LMP 06/27/2023   BMI 26.65 kg/m²       Physical Exam:  APPEARANCE: Well nourished, well developed, in no acute distress.  AFFECT: WNL, alert and oriented x 3  SKIN: No acne or hirsutism  NECK: Neck symmetric without masses or thyromegaly  NODES: No inguinal, cervical, axillary, or femoral lymph node enlargement  CHEST: Good respiratory effect  ABDOMEN: Soft.  No tenderness or masses.  No hepatosplenomegaly.  No hernias.  BREASTS: Symmetrical, no skin changes or visible lesions.  No palpable masses, nipple discharge left. Right breast with fibrocystic changes at 12 o'clock position 2-3cm above right nipple.  PELVIC: Normal external genitalia without lesions.  Normal hair distribution.  Adequate perineal body, normal urethral meatus.  Vagina moist and well rugated without lesions or discharge.  Cervix pink, without lesions, discharge or  tenderness.  No significant cystocele or rectocele.  Bimanual exam shows uterus to be normal size, regular, mobile and nontender.  Adnexa without masses or tenderness.  EXTREMITIES: No edema.    ASSESSMENT AND PLAN  1. Well woman exam with routine gynecological exam        2. Encounter for counseling regarding contraception            Annual exam  Breast and pelvic exam: right breaset with fibrocystic changes, will monitor for next few cycles and if increases in size or any pain will get US imaging   Patient counseled on ASCCP guidelines for cervical cytology screening  Cervical screening: due by 2024, last 2021 with 2022 unsatisfactory sample size after processing   Patient counseled on current recommendations for breast cancer screening  Mammogram screening: at 40  STD testing: not requested today   Contraception: OCPs, discussed IUD options today and patient will consider if she would like to proceed in future  Osteoporosis screening at 65  Tobacco cessation counseling n/a    She was counseled to follow up with her PCP for other routine health maintenance      Follow up in about 1 year (around 7/20/2024), or for IUD insertion if desires.      Mimi Matt MD  [facility]

## 2023-08-23 ENCOUNTER — PATIENT MESSAGE (OUTPATIENT)
Dept: OBSTETRICS AND GYNECOLOGY | Facility: CLINIC | Age: 28
End: 2023-08-23
Payer: COMMERCIAL

## 2023-08-23 DIAGNOSIS — N63.41 SUBAREOLAR MASS OF RIGHT BREAST: Primary | ICD-10-CM

## 2023-08-30 ENCOUNTER — HOSPITAL ENCOUNTER (OUTPATIENT)
Dept: RADIOLOGY | Facility: HOSPITAL | Age: 28
Discharge: HOME | End: 2023-08-30
Attending: STUDENT IN AN ORGANIZED HEALTH CARE EDUCATION/TRAINING PROGRAM
Payer: COMMERCIAL

## 2023-08-30 VITALS — BODY MASS INDEX: 26.54 KG/M2 | WEIGHT: 165.13 LBS | HEIGHT: 66 IN

## 2023-08-30 DIAGNOSIS — N63.41 SUBAREOLAR MASS OF RIGHT BREAST: ICD-10-CM

## 2023-08-30 PROCEDURE — 76642 US BREAST RIGHT LIMITED: ICD-10-PCS | Mod: 26,RT,, | Performed by: RADIOLOGY

## 2023-08-30 PROCEDURE — 76642 ULTRASOUND BREAST LIMITED: CPT | Mod: TC,RT

## 2023-08-30 PROCEDURE — 76642 ULTRASOUND BREAST LIMITED: CPT | Mod: 26,RT,, | Performed by: RADIOLOGY

## 2023-09-12 ENCOUNTER — HOSPITAL ENCOUNTER (OUTPATIENT)
Dept: RADIOLOGY | Facility: HOSPITAL | Age: 28
Discharge: HOME | End: 2023-09-12
Attending: STUDENT IN AN ORGANIZED HEALTH CARE EDUCATION/TRAINING PROGRAM
Payer: COMMERCIAL

## 2023-09-12 DIAGNOSIS — N63.0 BREAST LUMP: ICD-10-CM

## 2023-09-12 PROCEDURE — 19083 US BREAST BIOPSY WITH IMAGING 1ST SITE RIGHT: ICD-10-PCS | Mod: RT,,, | Performed by: RADIOLOGY

## 2023-09-12 PROCEDURE — 88305 TISSUE EXAM BY PATHOLOGIST: CPT | Mod: 26,,, | Performed by: STUDENT IN AN ORGANIZED HEALTH CARE EDUCATION/TRAINING PROGRAM

## 2023-09-12 PROCEDURE — 27200934 US BREAST BIOPSY WITH IMAGING 1ST SITE RIGHT

## 2023-09-12 PROCEDURE — 19083 BX BREAST 1ST LESION US IMAG: CPT | Mod: RT,,, | Performed by: RADIOLOGY

## 2023-09-12 PROCEDURE — 88305 TISSUE EXAM BY PATHOLOGIST: CPT | Performed by: STUDENT IN AN ORGANIZED HEALTH CARE EDUCATION/TRAINING PROGRAM

## 2023-09-12 PROCEDURE — 88305 TISSUE EXAM BY PATHOLOGIST: ICD-10-PCS | Mod: 26,,, | Performed by: STUDENT IN AN ORGANIZED HEALTH CARE EDUCATION/TRAINING PROGRAM

## 2023-09-14 LAB
COMMENT: NORMAL
FINAL PATHOLOGIC DIAGNOSIS: NORMAL
GROSS: NORMAL
Lab: NORMAL
MICROSCOPIC EXAM: NORMAL

## 2023-09-19 RX ORDER — NORGESTIMATE AND ETHINYL ESTRADIOL 0.25-0.035
1 KIT ORAL DAILY
Qty: 28 TABLET | Refills: 3 | Status: SHIPPED | OUTPATIENT
Start: 2023-09-19 | End: 2023-10-17 | Stop reason: SDUPTHER

## 2023-09-21 ENCOUNTER — TELEPHONE (OUTPATIENT)
Dept: OBSTETRICS AND GYNECOLOGY | Facility: CLINIC | Age: 28
End: 2023-09-21
Payer: COMMERCIAL

## 2023-10-18 RX ORDER — NORGESTIMATE AND ETHINYL ESTRADIOL 0.25-0.035
1 KIT ORAL DAILY
Qty: 84 TABLET | Refills: 3 | Status: SHIPPED | OUTPATIENT
Start: 2023-10-18 | End: 2024-01-13 | Stop reason: SDUPTHER

## 2023-10-18 NOTE — TELEPHONE ENCOUNTER
Refill Decision Note   Donna Noel  is requesting a refill authorization.  Brief Assessment and Rationale for Refill:  Approve     Medication Therapy Plan:  7/20/23 LOV with OBGYN with Dr. Matt; multiple duplicates in the chart      Alert overridden per protocol: Yes   Comments:     Note composed:10:58 AM 10/18/2023

## 2024-01-16 RX ORDER — NORGESTIMATE AND ETHINYL ESTRADIOL 0.25-0.035
1 KIT ORAL DAILY
Qty: 84 TABLET | Refills: 3 | OUTPATIENT
Start: 2024-01-16

## 2024-01-16 RX ORDER — NORGESTIMATE AND ETHINYL ESTRADIOL 0.25-0.035
1 KIT ORAL DAILY
Qty: 84 TABLET | Refills: 1 | Status: SHIPPED | OUTPATIENT
Start: 2024-01-16

## 2024-01-16 NOTE — TELEPHONE ENCOUNTER
Refill Decision Note   Donna Noel  is requesting a refill authorization.  Brief Assessment and Rationale for Refill:  Quick Discontinue     Medication Therapy Plan:        Comments:     Note composed:3:21 PM 01/16/2024

## 2024-01-16 NOTE — TELEPHONE ENCOUNTER
Refill Decision Note   Donna Noel  is requesting a refill authorization.  Brief Assessment and Rationale for Refill:  Approve     Medication Therapy Plan:        Comments:     Note composed:3:19 PM 01/16/2024

## 2024-03-12 ENCOUNTER — HOSPITAL ENCOUNTER (OUTPATIENT)
Dept: RADIOLOGY | Facility: HOSPITAL | Age: 29
Discharge: HOME | End: 2024-03-12
Attending: STUDENT IN AN ORGANIZED HEALTH CARE EDUCATION/TRAINING PROGRAM
Payer: COMMERCIAL

## 2024-03-12 DIAGNOSIS — Z09 FOLLOW-UP EXAM, 3-6 MONTHS SINCE PREVIOUS EXAM: ICD-10-CM

## 2024-03-12 PROCEDURE — 76642 ULTRASOUND BREAST LIMITED: CPT | Mod: 26,RT,, | Performed by: RADIOLOGY

## 2024-03-12 PROCEDURE — 76642 ULTRASOUND BREAST LIMITED: CPT | Mod: TC,RT

## 2024-06-25 RX ORDER — NORGESTIMATE AND ETHINYL ESTRADIOL 0.25-0.035
1 KIT ORAL
Qty: 84 TABLET | Refills: 0 | OUTPATIENT
Start: 2024-06-25

## 2024-06-25 NOTE — TELEPHONE ENCOUNTER
Refill Decision Note   Donna Noel  is requesting a refill authorization.  Brief Assessment and Rationale for Refill:  Quick Discontinue     Medication Therapy Plan:  Script pending provider's review in separate request       Comments:     Note composed:1:33 PM 06/25/2024

## 2024-06-25 NOTE — TELEPHONE ENCOUNTER
Refill Routing Note   Medication(s) are not appropriate for processing by [facility] for the following reason(s):        Patient not seen by provider within 15 months: last office visit 6/16/22 with Dr. Wiedemann     [facility] action(s):  Defer      Last office visit with Dr. Matt 7/20/23.         Appointments  past 12m or future 3m with PCP    Date Provider   Last Visit   6/16/2022 Wiedemann, Michael A., MD   Next Visit   Visit date not found Wiedemann, Michael A., MD   ED visits in past 90 days: 0        Note composed:1:32 PM 06/25/2024

## 2024-06-26 RX ORDER — NORGESTIMATE AND ETHINYL ESTRADIOL 0.25-0.035
1 KIT ORAL DAILY
Qty: 84 TABLET | Refills: 0 | Status: SHIPPED | OUTPATIENT
Start: 2024-06-26

## 2024-09-14 NOTE — TELEPHONE ENCOUNTER
Refill Routing Note   Medication(s) are not appropriate for processing by [facility] for the following reason(s):        Patient not seen by provider within 15 months    [facility] action(s):  Defer        Medication Therapy Plan: LOV was with Dr. Matt >12mths ago July 2023. Due for OV regardless      Appointments  past 12m or future 3m with PCP    Date Provider   Last Visit   Visit date not found Wiedemann, Michael A., MD   Next Visit   Visit date not found Wiedemann, Michael A., MD   ED visits in past 90 days: 0        Note composed:10:52 AM 09/14/2024

## 2024-09-15 RX ORDER — NORGESTIMATE AND ETHINYL ESTRADIOL 0.25-0.035
1 KIT ORAL
Qty: 84 TABLET | Refills: 0 | Status: SHIPPED | OUTPATIENT
Start: 2024-09-15

## 2024-10-10 ENCOUNTER — PATIENT MESSAGE (OUTPATIENT)
Dept: OBSTETRICS AND GYNECOLOGY | Facility: CLINIC | Age: 29
End: 2024-10-10
Payer: COMMERCIAL

## 2024-11-11 NOTE — TELEPHONE ENCOUNTER
Called patient to review breast biopsy results (fibroadenoma) and answer any questions. Patient feeling well, mild bruising and discomfort after biopsy but did have skin changes/peeling from bandage adhesive. Discussed benign nature of fibroadenoma, can change size with hormonal response. If symptoms increase or size increases, can consider evaluation by breast surgeon to remove. All questions answered.    [de-identified] : 54 year old male with PMHx of Hypothyroidism, presents for initial evaluation, c/o left knee pain for 2-3 weeks. Patient reports acute onset of constant, left lateral knee pain, radiating down to his left foot, described as "aching" and rated 5/10 on the pain scale. Patient is an active runner, who runs 35 miles per week, 4 days a week, but denies any recent falls, trauma, or other injuries that precipitated the onset of his knee pain. Patient also endorses associated swelling, but denies any buckling of the knees. Patient states that knee pain is worst when walking on a decline or descending stairs. Patient admits to taking Ibuprofen, which provides little to no improvement in his knee pain. Patient denies prior knee surgery or intra-articular injection. Patient otherwise states that he has been in his usual state of health. Patient ambulates without assistance and can perform ADL's independently. Patient denies any fever, chills, headache, dizziness, shortness of breath, chest pain, palpitations, abdominal pain, new onset urinary/bowel incontinence, saddle paresthesia, or other acute complaints at this time.

## 2024-12-04 ENCOUNTER — OFFICE VISIT (OUTPATIENT)
Dept: OBSTETRICS AND GYNECOLOGY | Facility: CLINIC | Age: 29
End: 2024-12-04
Payer: COMMERCIAL

## 2024-12-04 VITALS
DIASTOLIC BLOOD PRESSURE: 82 MMHG | SYSTOLIC BLOOD PRESSURE: 136 MMHG | BODY MASS INDEX: 27.65 KG/M2 | WEIGHT: 171.31 LBS

## 2024-12-04 DIAGNOSIS — Z01.419 WELL WOMAN EXAM WITH ROUTINE GYNECOLOGICAL EXAM: Primary | ICD-10-CM

## 2024-12-04 LAB
CLINICAL INFO: NORMAL
DATE OF PREVIOUS PAP: NORMAL
DATE PREVIOUS BX: NO
LMP START DATE: NORMAL
SPECIMEN SOURCE CVX/VAG CYTO: NORMAL

## 2024-12-04 PROCEDURE — 99999 PR PBB SHADOW E&M-EST. PATIENT-LVL II: CPT | Mod: PBBFAC,,, | Performed by: STUDENT IN AN ORGANIZED HEALTH CARE EDUCATION/TRAINING PROGRAM

## 2024-12-04 PROCEDURE — 3075F SYST BP GE 130 - 139MM HG: CPT | Mod: CPTII,S$GLB,, | Performed by: STUDENT IN AN ORGANIZED HEALTH CARE EDUCATION/TRAINING PROGRAM

## 2024-12-04 PROCEDURE — 3079F DIAST BP 80-89 MM HG: CPT | Mod: CPTII,S$GLB,, | Performed by: STUDENT IN AN ORGANIZED HEALTH CARE EDUCATION/TRAINING PROGRAM

## 2024-12-04 PROCEDURE — 88175 CYTOPATH C/V AUTO FLUID REDO: CPT | Performed by: STUDENT IN AN ORGANIZED HEALTH CARE EDUCATION/TRAINING PROGRAM

## 2024-12-04 PROCEDURE — 1159F MED LIST DOCD IN RCRD: CPT | Mod: CPTII,S$GLB,, | Performed by: STUDENT IN AN ORGANIZED HEALTH CARE EDUCATION/TRAINING PROGRAM

## 2024-12-04 PROCEDURE — 3008F BODY MASS INDEX DOCD: CPT | Mod: CPTII,S$GLB,, | Performed by: STUDENT IN AN ORGANIZED HEALTH CARE EDUCATION/TRAINING PROGRAM

## 2024-12-04 PROCEDURE — 99395 PREV VISIT EST AGE 18-39: CPT | Mod: S$GLB,,, | Performed by: STUDENT IN AN ORGANIZED HEALTH CARE EDUCATION/TRAINING PROGRAM

## 2024-12-04 NOTE — PROGRESS NOTES
CC: Well woman exam    HPI:  Donna Noel is a 28 y.o. female  presents for a well woman exam.  She has no issues, problems, or complaints. Stopped OCP and now she and  are trying to get pregnant. Cycles are regular, but shortening each month by 1 day. Tracking with flow oziel, has not used OPK yet. Right breast fibroadenoma unchanged.       Patient history:   History reviewed. No pertinent past medical history.  History reviewed. No pertinent surgical history.  OB History    Para Term  AB Living   0 0 0 0 0 0   SAB IAB Ectopic Multiple Live Births   0 0 0 0         GYN  Menopausal: No  History of abnormal paps:  ASCUS  Abnormal or postmenopausal bleeding: DENIES  History of abnormal mammograms:N/A   Family history of breast or ovarian cancer:  yes  Any breast masses, pain, skin changes, or nipple discharge: denies new masses, right breast stable  Possible recent STD exposure: denies  Contraception: None    Pap: 2024, Done today  Mammogram: N/A      Family History   Problem Relation Name Age of Onset    Cancer Maternal Grandfather      Breast cancer Paternal Aunt       Social History     Tobacco Use    Smoking status: Never    Smokeless tobacco: Never   Substance Use Topics    Alcohol use: Yes    Drug use: No     Allergies: Patient has no known allergies.    Current Outpatient Medications:     amoxicillin (AMOXIL) 500 MG capsule, Take 500 mg by mouth 3 (three) times daily., Disp: , Rfl:     SPRINTEC, 28, 0.25-35 mg-mcg per tablet, Take 1 tablet by mouth once daily, Disp: 84 tablet, Rfl: 0       ROS:  GENERAL: Denies weight gain or weight loss. Feeling well overall.   SKIN: Denies rash or lesions.   HEAD: Denies head injury or headache.   NODES: Denies enlarged lymph nodes.   CHEST: Denies chest pain or shortness of breath.   CARDIOVASCULAR: Denies palpitations or left sided chest pain.   ABDOMEN: No abdominal pain, constipation, diarrhea, nausea, vomiting or rectal bleeding.   URINARY:  No frequency, dysuria, hematuria, or burning on urination.  REPRODUCTIVE: See HPI.   BREASTS: The patient performs breast self-examination and denies pain, lumps, or nipple discharge.   HEMATOLOGIC: No easy bruisability or excessive bleeding.  MUSCULOSKELETAL: Denies joint pain or swelling.   NEUROLOGIC: Denies syncope or weakness.   PSYCHIATRIC: Denies depression, anxiety or mood swings.    Objective:   /82   Wt 77.7 kg (171 lb 4.8 oz)   BMI 27.65 kg/m²       Physical Exam:  APPEARANCE: Well nourished, well developed, in no acute distress.  AFFECT: WNL, alert and oriented x 3  SKIN: No acne or hirsutism  NECK: Neck symmetric without masses or thyromegaly  NODES: No inguinal, cervical, axillary, or femoral lymph node enlargement  CHEST: Good respiratory effect  ABDOMEN: Soft.  No tenderness or masses.  No hepatosplenomegaly.  No hernias.  BREASTS: Symmetrical, no skin changes or visible lesions.  No palpable masses, nipple discharge left. Right breast with 1cm round mass at 12 o'clock position 2-3cm above right nipple.  PELVIC: Normal external genitalia without lesions.  Normal hair distribution.  Adequate perineal body, normal urethral meatus.  Vagina moist and well rugated without lesions or discharge.  Cervix pink, without lesions, discharge or tenderness.  No significant cystocele or rectocele.    EXTREMITIES: No edema.    ASSESSMENT AND PLAN  1. Well woman exam with routine gynecological exam  Liquid-Based Pap Smear, Screening          Annual exam  Breast and pelvic exam: WNL, stable right breast fibroadenoma   Patient counseled on ASCCP guidelines for cervical cytology screening  Cervical screening: done today   Patient counseled on current recommendations for breast cancer screening  Mammogram screening: at 40  STD testing: not requested today   Discussed PNV, OPK with cycle tracking, preconception questions answered. Will notify office if she gets positive pregnancy test to confirm with blood work  and set up confirmation visit   Osteoporosis screening at 65  Tobacco cessation counseling n/a    She was counseled to follow up with her PCP for other routine health maintenance      Follow up in about 1 year (around 12/4/2025).      Mimi Matt MD  OBGYN [facility]

## 2024-12-12 ENCOUNTER — PATIENT MESSAGE (OUTPATIENT)
Dept: OBSTETRICS AND GYNECOLOGY | Facility: CLINIC | Age: 29
End: 2024-12-12
Payer: COMMERCIAL

## 2024-12-12 ENCOUNTER — TELEPHONE (OUTPATIENT)
Dept: OBSTETRICS AND GYNECOLOGY | Facility: CLINIC | Age: 29
End: 2024-12-12
Payer: COMMERCIAL

## 2024-12-12 NOTE — TELEPHONE ENCOUNTER
Attempted to contact pt to schedule a colpo. I left a voicemail for the pt to return a phone call to the clinic.

## 2024-12-16 ENCOUNTER — TELEPHONE (OUTPATIENT)
Dept: OBSTETRICS AND GYNECOLOGY | Facility: CLINIC | Age: 29
End: 2024-12-16
Payer: COMMERCIAL

## 2024-12-16 NOTE — TELEPHONE ENCOUNTER
----- Message from Sayda sent at 12/16/2024 11:15 AM CST -----  Type:  Patient Returning Call  Who Called: pt   Who Left Message for Patient: office   Does the patient know what this is regarding?:  schedule a colpo  Would the patient rather a call back or a response via [facility]? Call back   Best Call Back Number: [phone]  Additional Information:

## 2024-12-30 ENCOUNTER — TELEPHONE (OUTPATIENT)
Facility: CLINIC | Age: 29
End: 2024-12-30
Payer: COMMERCIAL

## 2024-12-30 NOTE — TELEPHONE ENCOUNTER
I spoke with pt regarding her concerns for her colpo procedure scheduled for Friday. Pt was concerned about the having someone in the room with her for emotional support. Pt was assured she can have someone in the room with her. I also walked pt through the procedure process to help keep her calm.

## 2025-01-03 ENCOUNTER — PROCEDURE VISIT (OUTPATIENT)
Facility: CLINIC | Age: 30
End: 2025-01-03
Payer: COMMERCIAL

## 2025-01-03 VITALS
DIASTOLIC BLOOD PRESSURE: 80 MMHG | WEIGHT: 169.31 LBS | BODY MASS INDEX: 27.21 KG/M2 | SYSTOLIC BLOOD PRESSURE: 120 MMHG | HEIGHT: 66 IN

## 2025-01-03 DIAGNOSIS — L30.4 INTERTRIGO: ICD-10-CM

## 2025-01-03 DIAGNOSIS — R87.612 LGSIL ON PAP SMEAR OF CERVIX: Primary | ICD-10-CM

## 2025-01-03 RX ORDER — CLOTRIMAZOLE AND BETAMETHASONE DIPROPIONATE 10; .64 MG/G; MG/G
CREAM TOPICAL 2 TIMES DAILY
Qty: 45 G | Refills: 0 | Status: SHIPPED | OUTPATIENT
Start: 2025-01-03

## 2025-01-03 NOTE — PROCEDURES
Colposcopy    Date/Time: 1/3/2025 10:00 AM    Performed by: Mimi Matt MD  Authorized by: Mimi Matt MD    Consent obatined:  Prior to procedure the appropriate consent was completed and verified    Colposcopy Site:  Cervix  Position:  Supine  Acrowhite Lesion? Yes    Atypical Vessels: No    Transformation Zone Adequate?: Yes    Biopsy?: Yes         Location:  Cervix ((6 00))  ECC Performed?: Yes    LEEP Performed?: No    Estimated blood loss (cc):  0   Patient tolerated the procedure well with no immediate complications.   Post-operative instructions were provided for the patient.   Patient was discharged and will follow up if any complications occur

## 2025-01-03 NOTE — PROGRESS NOTES
"CC: abnormal pap, here for colposcopy     HPI:  Donna Noel is a 29 y.o. female   presents for colposcopy.  Patient's last menstrual period was 2024 (exact date)..  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      Has rash today in armpits that is itching/burning    ROS:  GENERAL: Denies weight gain or weight loss. Feeling well overall.   SKIN: Denies rash or lesions.   HEAD: Denies head injury or headache.   NODES: Denies enlarged lymph nodes.   CHEST: Denies chest pain or shortness of breath.   CARDIOVASCULAR: Denies palpitations or left sided chest pain.   ABDOMEN: No abdominal pain, constipation, diarrhea, nausea, vomiting or rectal bleeding.   URINARY: No frequency, dysuria, hematuria, or burning on urination.  REPRODUCTIVE: See HPI.   BREASTS: The patient performs breast self-examination and denies pain, lumps, or nipple discharge.   HEMATOLOGIC: No easy bruisability or excessive bleeding.  MUSCULOSKELETAL: Denies joint pain or swelling.   NEUROLOGIC: Denies syncope or weakness.   PSYCHIATRIC: Denies depression, anxiety or mood swings.    Objective:   /80   Ht 5' 6" (1.676 m)   Wt 76.8 kg (169 lb 5 oz)   LMP 2024 (Exact Date)   BMI 27.33 kg/m²       Physical Exam:  APPEARANCE: Well nourished, well developed, in no acute distress.  AFFECT: WNL, alert and oriented x 3  PELVIC: Normal external genitalia without lesions.  Normal hair distribution.  Adequate perineal body, normal urethral meatus.  Vagina moist and well rugated without lesions or discharge.  Cervix pink, without discharge or tenderness. Colpo with biopsy, ECC completed today       Assessment and Plan:    Abnormal pap smear  - The abnormal test findings and HPV infection were discussed. Patient counseled on the need for colposcopy and possible biopsies/ECC to determine further indicated treatments. Discussed minimal risk of bleeding and " infection with colposcopy, and alternatives to colposcopy and patient agreed to proceed.    - UPT is negative  - Post-colposcopy counseling completed including: manage post-colposcopy cramping with NSAIDs or Tylenol.  Avoid intercourse, douching, or tampons in the vagina for at least 2-3 days.  Expect a clumpy blackish discharge due to Monsel's solution application for several days.  Report heavy bleeding, worsening pain or pain that does not respond to above medications, or foul-smelling vaginal discharge.   - Follow up pending colposcopy results.    2. Rash, suspected intertrigo   - discussed exam findings, will try topical lotrisone cream       Mimi Matt MD  OBGYN [facility]

## 2025-03-25 ENCOUNTER — PATIENT MESSAGE (OUTPATIENT)
Facility: CLINIC | Age: 30
End: 2025-03-25
Payer: COMMERCIAL

## 2025-04-02 ENCOUNTER — OFFICE VISIT (OUTPATIENT)
Dept: OBSTETRICS AND GYNECOLOGY | Facility: CLINIC | Age: 30
End: 2025-04-02
Payer: COMMERCIAL

## 2025-04-02 ENCOUNTER — LAB VISIT (OUTPATIENT)
Dept: LAB | Facility: HOSPITAL | Age: 30
End: 2025-04-02
Attending: STUDENT IN AN ORGANIZED HEALTH CARE EDUCATION/TRAINING PROGRAM
Payer: COMMERCIAL

## 2025-04-02 VITALS
BODY MASS INDEX: 27.14 KG/M2 | WEIGHT: 168.88 LBS | DIASTOLIC BLOOD PRESSURE: 82 MMHG | HEIGHT: 66 IN | SYSTOLIC BLOOD PRESSURE: 123 MMHG

## 2025-04-02 DIAGNOSIS — N91.2 AMENORRHEA: ICD-10-CM

## 2025-04-02 DIAGNOSIS — N91.2 AMENORRHEA: Primary | ICD-10-CM

## 2025-04-02 LAB
ABSOLUTE EOSINOPHIL (OHS): 0.09 K/UL
ABSOLUTE MONOCYTE (OHS): 0.73 K/UL (ref 0.3–1)
ABSOLUTE NEUTROPHIL COUNT (OHS): 4.51 K/UL (ref 1.8–7.7)
BASOPHILS # BLD AUTO: 0.04 K/UL
BASOPHILS NFR BLD AUTO: 0.6 %
ERYTHROCYTE [DISTWIDTH] IN BLOOD BY AUTOMATED COUNT: 12 % (ref 11.5–14.5)
HBV SURFACE AG SERPL QL IA: NORMAL
HCT VFR BLD AUTO: 39.5 % (ref 37–48.5)
HCV AB SERPL QL IA: NORMAL
HGB BLD-MCNC: 13.7 GM/DL (ref 12–16)
HIV 1+2 AB+HIV1 P24 AG SERPL QL IA: NORMAL
IMM GRANULOCYTES # BLD AUTO: 0.03 K/UL (ref 0–0.04)
IMM GRANULOCYTES NFR BLD AUTO: 0.4 % (ref 0–0.5)
INDIRECT COOMBS: NORMAL
LYMPHOCYTES # BLD AUTO: 1.77 K/UL (ref 1–4.8)
MCH RBC QN AUTO: 31.2 PG (ref 27–31)
MCHC RBC AUTO-ENTMCNC: 34.7 G/DL (ref 32–36)
MCV RBC AUTO: 90 FL (ref 82–98)
NUCLEATED RBC (/100WBC) (OHS): 0 /100 WBC
PLATELET # BLD AUTO: 239 K/UL (ref 150–450)
PMV BLD AUTO: 9.5 FL (ref 9.2–12.9)
RBC # BLD AUTO: 4.39 M/UL (ref 4–5.4)
RELATIVE EOSINOPHIL (OHS): 1.3 %
RELATIVE LYMPHOCYTE (OHS): 24.7 % (ref 18–48)
RELATIVE MONOCYTE (OHS): 10.2 % (ref 4–15)
RELATIVE NEUTROPHIL (OHS): 62.8 % (ref 38–73)
RH BLD: NORMAL
SPECIMEN OUTDATE: NORMAL
T PALLIDUM IGG+IGM SER QL: NORMAL
WBC # BLD AUTO: 7.17 K/UL (ref 3.9–12.7)

## 2025-04-02 PROCEDURE — 3079F DIAST BP 80-89 MM HG: CPT | Mod: CPTII,S$GLB,, | Performed by: STUDENT IN AN ORGANIZED HEALTH CARE EDUCATION/TRAINING PROGRAM

## 2025-04-02 PROCEDURE — 3074F SYST BP LT 130 MM HG: CPT | Mod: CPTII,S$GLB,, | Performed by: STUDENT IN AN ORGANIZED HEALTH CARE EDUCATION/TRAINING PROGRAM

## 2025-04-02 PROCEDURE — 3008F BODY MASS INDEX DOCD: CPT | Mod: CPTII,S$GLB,, | Performed by: STUDENT IN AN ORGANIZED HEALTH CARE EDUCATION/TRAINING PROGRAM

## 2025-04-02 PROCEDURE — 87389 HIV-1 AG W/HIV-1&-2 AB AG IA: CPT

## 2025-04-02 PROCEDURE — 87340 HEPATITIS B SURFACE AG IA: CPT

## 2025-04-02 PROCEDURE — 36415 COLL VENOUS BLD VENIPUNCTURE: CPT

## 2025-04-02 PROCEDURE — 83021 HEMOGLOBIN CHROMOTOGRAPHY: CPT

## 2025-04-02 PROCEDURE — 86593 SYPHILIS TEST NON-TREP QUANT: CPT

## 2025-04-02 PROCEDURE — 1159F MED LIST DOCD IN RCRD: CPT | Mod: CPTII,S$GLB,, | Performed by: STUDENT IN AN ORGANIZED HEALTH CARE EDUCATION/TRAINING PROGRAM

## 2025-04-02 PROCEDURE — 86762 RUBELLA ANTIBODY: CPT

## 2025-04-02 PROCEDURE — 99999 PR PBB SHADOW E&M-EST. PATIENT-LVL III: CPT | Mod: PBBFAC,,, | Performed by: STUDENT IN AN ORGANIZED HEALTH CARE EDUCATION/TRAINING PROGRAM

## 2025-04-02 PROCEDURE — 85025 COMPLETE CBC W/AUTO DIFF WBC: CPT

## 2025-04-02 PROCEDURE — 81220 CFTR GENE COM VARIANTS: CPT

## 2025-04-02 PROCEDURE — 86850 RBC ANTIBODY SCREEN: CPT | Performed by: STUDENT IN AN ORGANIZED HEALTH CARE EDUCATION/TRAINING PROGRAM

## 2025-04-02 PROCEDURE — 86803 HEPATITIS C AB TEST: CPT

## 2025-04-02 PROCEDURE — 87088 URINE BACTERIA CULTURE: CPT | Performed by: STUDENT IN AN ORGANIZED HEALTH CARE EDUCATION/TRAINING PROGRAM

## 2025-04-02 PROCEDURE — 99214 OFFICE O/P EST MOD 30 MIN: CPT | Mod: S$GLB,,, | Performed by: STUDENT IN AN ORGANIZED HEALTH CARE EDUCATION/TRAINING PROGRAM

## 2025-04-02 NOTE — PROGRESS NOTES
"  Reason for Visit   Initial Prenatal Visit    HPI   29 y.o. female  at 5w0d by Patient's last menstrual period was 2025 (exact date). presents for initial OB appointment. Patient feels well this pregnancy, reports no major issues/concerns.     Nausea:  Mild  Vomiting: No  Cramping: Mild  Bleeding:  No    Family history of bleeding disorders, birth defects, or mental disability: DENIES  Complications with prior pregnancy: N/A    Pap: 12/10/2024, LSIL > colpo 2025 CIN1  Allergies: Patient has no known allergies.  OB History    Para Term  AB Living   0 0 0 0 0 0   SAB IAB Ectopic Multiple Live Births   0 0 0 0 0     History reviewed. No pertinent past medical history.   History reviewed. No pertinent surgical history.     ROS:  GENERAL: Denies weight gain or weight loss. Feeling well overall.   SKIN: Denies rash or lesions.   HEAD: Denies head injury or headache.   NODES: Denies enlarged lymph nodes.   CHEST: Denies chest pain or shortness of breath.   CARDIOVASCULAR: Denies palpitations or left sided chest pain.   ABDOMEN: No abdominal pain, constipation, diarrhea, nausea, vomiting or rectal bleeding.   URINARY: No frequency, dysuria, hematuria, or burning on urination.  REPRODUCTIVE: See HPI.   BREASTS: The patient performs breast self-examination and denies pain, lumps, or nipple discharge.   HEMATOLOGIC: No easy bruisability or excessive bleeding.  MUSCULOSKELETAL: Denies joint pain or swelling.   NEUROLOGIC: Denies syncope or weakness.   PSYCHIATRIC: Denies depression, anxiety or mood swings.      Exam   /82 (BP Location: Right arm, Patient Position: Sitting)   Ht 5' 6" (1.676 m)   Wt 76.6 kg (168 lb 14.4 oz)   LMP 2025 (Exact Date)   BMI 27.26 kg/m²     Physical Exam:  APPEARANCE: Well nourished, well developed, in no acute distress.  AFFECT: WNL, alert and oriented x 3  SKIN: No acne or hirsutism  NECK: Neck symmetric without masses or thyromegaly  CHEST: Good " respiratory effect  ABDOMEN: Soft.  No tenderness or masses.  No hepatosplenomegaly.  No hernias.  PELVIC: Normal external genitalia without lesions.  Normal hair distribution.  Adequate perineal body, normal urethral meatus.    EXTREMITIES: No edema.    Assessment and Plan   Amenorrhea  -     Hepatitis C Antibody; Future; Expected date: 04/02/2025  -     HIV 1/2 Ag/Ab (4th Gen); Future; Expected date: 04/02/2025  -     Treponema Pallidium Antibodies IgG, IgM; Future; Expected date: 04/02/2025  -     Hepatitis B surface antigen; Future; Expected date: 04/02/2025  -     Type & Screen; Future; Expected date: 04/02/2025  -     Rubella antibody, IgG; Future; Expected date: 04/02/2025  -     Urine culture  -     CBC auto differential; Future; Expected date: 04/02/2025  -     US OB/GYN Procedure ([facility]); Future  -     C. trachomatis/N. gonorrhoeae by AMP DNA [facility]; Vagina  -     Hemoglobin Electrophoresis,Hgb A2 Milad.; Future; Expected date: 04/02/2025  -     Cystic Fibrosis Mutation Panel; Future; Expected date: 04/02/2025  -     Vaginosis Screen by DNA Probe  -     POCT URINALYSIS  -     POCT Urine Pregnancy        - Dating US ordered  - Initial prenatal labs ordered   - Hgb electrophoresis, Carrier screening: ordered  - Aneuploidy screening: discussed after 10wga  - PNV: taking   - ASA: low risk     Patient was counseled today on:  - Routine prenatal blood tests including HIV and anticipated course of prenatal care  - Prenatal vitamins and folic acid  - Weight gain, nutrition, and exercise  - Foods to avoid in pregnancy (i.e. sushi, fish that are high in mercury, deli meat, and unpasteurized cheeses).   - Avoiding cat litter and raw meats due to risk of Toxoplasmosis   - Aneuploidy and neural tube screening: cfDNA vs integrated screening, AFP screen at 15 weeks  - Hgb electrophoresis and Carrier screening (CF, SMA) offered, fragile X as indicated by patient history   - OTC medication in the first trimester  -  Harmful effects of smoking, alcohol, and recreational drugs  - [facility] u/s for anatomy at 18-20 weeks.  - Seat belt use  - Childbirth classes and hospital facilities  - Potential of male provider at delivery due to OB call rotation  - COVID 19, flu vaccination   - Pain, bleeding, and ED precautions given.  - All questions were answered          Return to clinic in 4 weeks          Mimi Matt MD  OBGYN [facility]

## 2025-04-03 ENCOUNTER — TELEPHONE (OUTPATIENT)
Dept: OBSTETRICS AND GYNECOLOGY | Facility: CLINIC | Age: 30
End: 2025-04-03
Payer: COMMERCIAL

## 2025-04-03 LAB
HGB A2 MFR BLD HPLC: 2.4 % (ref 2.2–3.2)
HGB FRACT BLD ELPH-IMP: NORMAL
PATHOLOGIST INTERPRETATION - HGB SERUM (OHS): NORMAL
RUBV IGG SER-ACNC: 17 IU/ML
RUBV IGG SER-IMP: REACTIVE

## 2025-04-03 NOTE — TELEPHONE ENCOUNTER
Called pt to schedule ultrasound for three weeks, but pt decided to schedule the same say as her next appt on 4/30/25

## 2025-04-03 NOTE — TELEPHONE ENCOUNTER
----- Message from Med Assistant [place] sent at 4/2/2025 11:56 AM CDT -----  Regarding: dating ultrasound  Good morning,I hope all is well. This patient needs a dating ultrasound in 3 weeks. She is currently 5 weeks. She prefers Fridays so her  can attend her appointment.Thanks,NEHEMIAH [place]

## 2025-04-05 LAB — BACTERIA UR CULT: ABNORMAL

## 2025-04-07 ENCOUNTER — RESULTS FOLLOW-UP (OUTPATIENT)
Facility: CLINIC | Age: 30
End: 2025-04-07

## 2025-04-07 RX ORDER — CEPHALEXIN 500 MG/1
500 CAPSULE ORAL 4 TIMES DAILY
Qty: 40 CAPSULE | Refills: 0 | Status: SHIPPED | OUTPATIENT
Start: 2025-04-07 | End: 2025-04-17

## 2025-04-08 LAB — W CYSTIC FIBROSIS MUTATION ANALYSIS: NORMAL

## 2025-04-10 NOTE — TELEPHONE ENCOUNTER
Called Patient spoke to her about  her symptoms , that stay constant daily which is inferring with her work schedule     Patient is pregnant     Symptoms :    PT still having very uncomfortable cramping my lower abdominal/pelvic area.   It goes from dull to really sharp and sometimes lasts several minutes before,then goes back to dull.   I feel them all day long.   I am having normal discharge and bowel movements, no spotting or bleeding.  They are becoming so painful at times I feel myself get weak and nauseous.       Pt :  Is using heat and pushing down on her pelvic   repositioning to get comfortable  Deep breathing through it   PT : asking   Is this something she should be concerned about?   Are there ways that She can relieve them?       Appointment 04/30/2025 9:00am ABBY                        04/30/2025 9:45am

## 2025-04-11 ENCOUNTER — TELEPHONE (OUTPATIENT)
Facility: CLINIC | Age: 30
End: 2025-04-11
Payer: COMMERCIAL

## 2025-04-11 DIAGNOSIS — O21.9 NAUSEA/VOMITING IN PREGNANCY: Primary | ICD-10-CM

## 2025-04-11 RX ORDER — PROMETHAZINE HYDROCHLORIDE 12.5 MG/1
12.5 TABLET ORAL EVERY 6 HOURS PRN
Qty: 60 TABLET | Refills: 3 | Status: SHIPPED | OUTPATIENT
Start: 2025-04-11

## 2025-04-11 RX ORDER — SUCRALFATE 1 G/1
1 TABLET ORAL
Qty: 60 TABLET | Refills: 3 | Status: SHIPPED | OUTPATIENT
Start: 2025-04-11 | End: 2026-04-11

## 2025-04-11 NOTE — TELEPHONE ENCOUNTER
Called patient to review cramping pains, feeling better today than yesterday. Reports being told in past she may have had reflux/GERD, possible ulcer with upper GI when she had some evaluation for NV/pains with eating. Not exactly the same pain she is having now, feels more sharp and crampy. Is trying simethicone, having normal BM, has stopped eating lettuce daily maybe less fiber in diet. Will try phenergan for nausea and carafate, if no improvement will get imaging of pelvis next week to check ovaries.

## 2025-04-30 ENCOUNTER — ROUTINE PRENATAL (OUTPATIENT)
Dept: OBSTETRICS AND GYNECOLOGY | Facility: CLINIC | Age: 30
End: 2025-04-30
Payer: COMMERCIAL

## 2025-04-30 ENCOUNTER — PROCEDURE VISIT (OUTPATIENT)
Dept: MATERNAL FETAL MEDICINE | Facility: CLINIC | Age: 30
End: 2025-04-30
Payer: COMMERCIAL

## 2025-04-30 VITALS
WEIGHT: 174.63 LBS | BODY MASS INDEX: 28.18 KG/M2 | DIASTOLIC BLOOD PRESSURE: 74 MMHG | SYSTOLIC BLOOD PRESSURE: 112 MMHG

## 2025-04-30 DIAGNOSIS — G43.009 MIGRAINE WITHOUT AURA AND WITHOUT STATUS MIGRAINOSUS, NOT INTRACTABLE: ICD-10-CM

## 2025-04-30 DIAGNOSIS — Z3A.09 9 WEEKS GESTATION OF PREGNANCY: Primary | ICD-10-CM

## 2025-04-30 DIAGNOSIS — N91.2 AMENORRHEA: ICD-10-CM

## 2025-04-30 PROCEDURE — 76801 OB US < 14 WKS SINGLE FETUS: CPT | Mod: S$GLB,,, | Performed by: STUDENT IN AN ORGANIZED HEALTH CARE EDUCATION/TRAINING PROGRAM

## 2025-04-30 PROCEDURE — 99999 PR PBB SHADOW E&M-EST. PATIENT-LVL III: CPT | Mod: PBBFAC,,, | Performed by: STUDENT IN AN ORGANIZED HEALTH CARE EDUCATION/TRAINING PROGRAM

## 2025-04-30 PROCEDURE — 0502F SUBSEQUENT PRENATAL CARE: CPT | Mod: CPTII,S$GLB,, | Performed by: STUDENT IN AN ORGANIZED HEALTH CARE EDUCATION/TRAINING PROGRAM

## 2025-04-30 RX ORDER — SUMATRIPTAN SUCCINATE 25 MG/1
25 TABLET ORAL
Qty: 30 TABLET | Refills: 0 | Status: SHIPPED | OUTPATIENT
Start: 2025-04-30 | End: 2025-05-30

## 2025-04-30 NOTE — PROGRESS NOTES
Reason for Visit   Routine Prenatal Visit    HPI   29 y.o., at 9w1d by Estimated Date of Delivery: 12/2/25    Patient feels well today. Cramping has significantly improved, feels fatigued. Had bad migraine Monday that required her to stay home from work, tylenol helped a little. Mild nausea, feels like she has to eat all the time she had increased appetite    Contractions: No   Bleeding: No   Loss of fluid: No   Fetal movement: No   Nausea: Yes   Vomiting: No   Headache: Yes     Pregnancy dating, labs, ultrasound reports, prenatal testing, and problem list; prior records and results; and available outside records were reviewed and updated in EMR.      Current Medications[1]   Exam   /74   Wt 79.2 kg (174 lb 9.6 oz)   LMP 02/26/2025 (Exact Date)   BMI 28.18 kg/m²     GENERAL: No acute distress  ABD: Gravid  Fundal height: not yet palpable   FHR: 175 on US  SVE: n/a    Assessment and Plan   9 weeks gestation of pregnancy        - CARLOZ 12/2/25 by 9wk US  - PNLs wnl  - Hgb electrophoresis, Carrier screening: neg  - Aneuploidy screening: discussed after 10wga, option to complete genetic testing without gender or do gender testing and X/Y chromosome genetic testing as well. She and  will consider and let me know how they want to proceed  - PNV: taking   - ASA: low risk     Migraines   - can continue tylenol PRN, discussed mag ox supplementation   - imitrex rx use only as needed for severe migraine       Pain and bleeding precautions given  Follow-up: 4 weeks         [1]   Current Outpatient Medications:     promethazine (PHENERGAN) 12.5 MG Tab, Take 1 tablet (12.5 mg total) by mouth every 6 (six) hours as needed (nausea)., Disp: 60 tablet, Rfl: 3    amoxicillin (AMOXIL) 500 MG capsule, Take 500 mg by mouth 3 (three) times daily., Disp: , Rfl:     clotrimazole-betamethasone 1-0.05% (LOTRISONE) cream, Apply topically 2 (two) times daily. (Patient not taking: Reported on 4/30/2025), Disp: 45 g, Rfl: 0     SPRINTEC, 28, 0.25-35 mg-mcg per tablet, Take 1 tablet by mouth once daily, Disp: 84 tablet, Rfl: 0    sucralfate (CARAFATE) 1 gram tablet, Take 1 tablet (1 g total) by mouth after meals as needed (stomach pains). (Patient not taking: Reported on 4/30/2025), Disp: 60 tablet, Rfl: 3

## 2025-05-28 ENCOUNTER — ROUTINE PRENATAL (OUTPATIENT)
Dept: OBSTETRICS AND GYNECOLOGY | Facility: CLINIC | Age: 30
End: 2025-05-28
Payer: COMMERCIAL

## 2025-05-28 VITALS
WEIGHT: 183.88 LBS | SYSTOLIC BLOOD PRESSURE: 116 MMHG | DIASTOLIC BLOOD PRESSURE: 75 MMHG | BODY MASS INDEX: 29.68 KG/M2

## 2025-05-28 DIAGNOSIS — Z3A.13 13 WEEKS GESTATION OF PREGNANCY: Primary | ICD-10-CM

## 2025-05-28 PROCEDURE — 99999 PR PBB SHADOW E&M-EST. PATIENT-LVL III: CPT | Mod: PBBFAC,,, | Performed by: STUDENT IN AN ORGANIZED HEALTH CARE EDUCATION/TRAINING PROGRAM

## 2025-05-28 PROCEDURE — 81003 URINALYSIS AUTO W/O SCOPE: CPT | Performed by: STUDENT IN AN ORGANIZED HEALTH CARE EDUCATION/TRAINING PROGRAM

## 2025-05-28 PROCEDURE — 87086 URINE CULTURE/COLONY COUNT: CPT | Performed by: STUDENT IN AN ORGANIZED HEALTH CARE EDUCATION/TRAINING PROGRAM

## 2025-05-28 PROCEDURE — 0502F SUBSEQUENT PRENATAL CARE: CPT | Mod: CPTII,S$GLB,, | Performed by: STUDENT IN AN ORGANIZED HEALTH CARE EDUCATION/TRAINING PROGRAM

## 2025-05-28 NOTE — PROGRESS NOTES
Reason for Visit   Routine Prenatal Visit    HPI   29 y.o., at 13w1d by Estimated Date of Delivery: 12/2/25    Patient feels well today, no major complaints. Noted cloudy urine in the morning but no pain with urination. Still having headaches but less frequent     Contractions: No   Bleeding: No   Loss of fluid: No   Fetal movement: No   Nausea: No   Vomiting: No   Headache: Yes     Pregnancy dating, labs, ultrasound reports, prenatal testing, and problem list; prior records and results; and available outside records were reviewed and updated in EMR.      Current Medications[1]   Exam   LMP 02/26/2025 (Exact Date)     GENERAL: No acute distress  ABD: Gravid  Fundal height: 14  FHR: 156  SVE: n/a    Assessment and Plan   13 weeks gestation of pregnancy  -     Connected MOM Enrollment  -     Assign Connected MOM Program Consent Questionnaire        - CARLOZ 12/2/25 by 9wk US  - PNLs wnl  - Hgb electrophoresis, Carrier screening: neg  - Aneuploidy screening: discussed, will complete in future if needed based on US findings if indicated but wants to wait for now  - PNV: taking   - ASA: low risk   - UA/culture today     Migraines   - can continue tylenol PRN, discussed mag ox supplementation   - imitrex rx use only as needed for severe migraine       Pain and bleeding precautions given  Follow-up: 4 weeks           [1]   Current Outpatient Medications:     amoxicillin (AMOXIL) 500 MG capsule, Take 500 mg by mouth 3 (three) times daily., Disp: , Rfl:     clotrimazole-betamethasone 1-0.05% (LOTRISONE) cream, Apply topically 2 (two) times daily. (Patient not taking: Reported on 4/30/2025), Disp: 45 g, Rfl: 0    promethazine (PHENERGAN) 12.5 MG Tab, Take 1 tablet (12.5 mg total) by mouth every 6 (six) hours as needed (nausea)., Disp: 60 tablet, Rfl: 3    SPRINTEC, 28, 0.25-35 mg-mcg per tablet, Take 1 tablet by mouth once daily, Disp: 84 tablet, Rfl: 0    sucralfate (CARAFATE) 1 gram tablet, Take 1 tablet (1 g total) by mouth  after meals as needed (stomach pains). (Patient not taking: Reported on 4/30/2025), Disp: 60 tablet, Rfl: 3    sumatriptan (IMITREX) 25 MG Tab, Take 1 tablet (25 mg total) by mouth every 2 (two) hours as needed (mirgraine). No more than 4 doses in 24 hours, Disp: 30 tablet, Rfl: 0

## 2025-05-29 LAB
AMORPH CRY UR QL COMP ASSIST: ABNORMAL
BACTERIA #/AREA URNS AUTO: ABNORMAL /HPF
BILIRUB UR QL STRIP.AUTO: NEGATIVE
CLARITY UR: ABNORMAL
COLOR UR AUTO: YELLOW
GLUCOSE UR QL STRIP: NEGATIVE
HGB UR QL STRIP: NEGATIVE
KETONES UR QL STRIP: NEGATIVE
LEUKOCYTE ESTERASE UR QL STRIP: NEGATIVE
MICROSCOPIC COMMENT: ABNORMAL
NITRITE UR QL STRIP: NEGATIVE
PH UR STRIP: 8 [PH]
PROT UR QL STRIP: NEGATIVE
SP GR UR STRIP: 1.02
SQUAMOUS #/AREA URNS AUTO: 3 /HPF
UROBILINOGEN UR STRIP-ACNC: NEGATIVE EU/DL

## 2025-05-30 LAB — BACTERIA UR CULT: NORMAL

## 2025-06-02 ENCOUNTER — RESULTS FOLLOW-UP (OUTPATIENT)
Facility: CLINIC | Age: 30
End: 2025-06-02

## 2025-06-02 RX ORDER — CEPHALEXIN 500 MG/1
500 CAPSULE ORAL 4 TIMES DAILY
Qty: 40 CAPSULE | Refills: 0 | Status: SHIPPED | OUTPATIENT
Start: 2025-06-02 | End: 2025-06-12

## 2025-06-09 ENCOUNTER — PATIENT MESSAGE (OUTPATIENT)
Dept: OBSTETRICS AND GYNECOLOGY | Facility: CLINIC | Age: 30
End: 2025-06-09
Payer: COMMERCIAL

## 2025-06-17 ENCOUNTER — PATIENT MESSAGE (OUTPATIENT)
Dept: OTHER | Facility: OTHER | Age: 30
End: 2025-06-17
Payer: COMMERCIAL

## 2025-06-24 ENCOUNTER — PATIENT MESSAGE (OUTPATIENT)
Dept: OTHER | Facility: OTHER | Age: 30
End: 2025-06-24
Payer: COMMERCIAL

## 2025-06-25 ENCOUNTER — TELEPHONE (OUTPATIENT)
Dept: OBSTETRICS AND GYNECOLOGY | Facility: CLINIC | Age: 30
End: 2025-06-25

## 2025-06-25 ENCOUNTER — ROUTINE PRENATAL (OUTPATIENT)
Dept: OBSTETRICS AND GYNECOLOGY | Facility: CLINIC | Age: 30
End: 2025-06-25
Payer: COMMERCIAL

## 2025-06-25 VITALS
DIASTOLIC BLOOD PRESSURE: 67 MMHG | BODY MASS INDEX: 31.08 KG/M2 | SYSTOLIC BLOOD PRESSURE: 107 MMHG | WEIGHT: 192.56 LBS

## 2025-06-25 DIAGNOSIS — Z3A.17 17 WEEKS GESTATION OF PREGNANCY: Primary | ICD-10-CM

## 2025-06-25 PROCEDURE — 99999 PR PBB SHADOW E&M-EST. PATIENT-LVL III: CPT | Mod: PBBFAC,,, | Performed by: STUDENT IN AN ORGANIZED HEALTH CARE EDUCATION/TRAINING PROGRAM

## 2025-06-25 PROCEDURE — 0502F SUBSEQUENT PRENATAL CARE: CPT | Mod: CPTII,S$GLB,, | Performed by: STUDENT IN AN ORGANIZED HEALTH CARE EDUCATION/TRAINING PROGRAM

## 2025-06-25 NOTE — TELEPHONE ENCOUNTER
----- Message from Mimi Matt MD sent at 6/25/2025  9:45 AM CDT -----  Hi! Can you schedule this patient for anatomy scan in next 2-3 weeks? She is 17 weeks today. Thanks!

## 2025-06-25 NOTE — PROGRESS NOTES
Reason for Visit   Routine Prenatal Visit    HPI   29 y.o., at 17w1d by Estimated Date of Delivery: 12/2/25    Patient feels well today, no complaints. noticed a little higher heart rate than usual (101/102) when driving, reports hx of SVT was on PRN propranolol in past mostly just avoids triggers to help (stress)    Contractions: No   Bleeding: No   Loss of fluid: No   Fetal movement: Flutters   Nausea: No   Vomiting: No   Headache: No     Pregnancy dating, labs, ultrasound reports, prenatal testing, and problem list; prior records and results; and available outside records were reviewed and updated in EMR.      Current Medications[1]   Exam   /67   Wt 87.4 kg (192 lb 9.2 oz)   LMP 02/26/2025 (Exact Date)   BMI 31.08 kg/m²     GENERAL: No acute distress  ABD: Gravid  Fundal height: 19  FHR: 148  SVE: n/a    Assessment and Plan   17 weeks gestation of pregnancy  -     US OB/GYN Procedure ([facility]); Future        - CARLOZ 12/2/25 by 9wk US  - PNLs wnl  - Hgb electrophoresis, Carrier screening: neg  - Aneuploidy screening: discussed, will complete in future if needed based on US findings if indicated but wants to wait for now  - PNV: taking   - ASA: low risk   - MFM US: anatomy ordered  - 1hr GTT/third trimester labs: 28wga  - Tdap: 28wga   - consents: to be signed at future visit      Hx SVT  - was on PRN propranolol  - discussed use in pregnancy safe if needed    Migraines   - can continue tylenol PRN, discussed mag ox supplementation   - imitrex rx use only as needed for severe migraine       Pain and bleeding precautions given  Follow-up: 4 weeks             [1]   Current Outpatient Medications:     promethazine (PHENERGAN) 12.5 MG Tab, Take 1 tablet (12.5 mg total) by mouth every 6 (six) hours as needed (nausea)., Disp: 60 tablet, Rfl: 3    amoxicillin (AMOXIL) 500 MG capsule, Take 500 mg by mouth 3 (three) times daily., Disp: , Rfl:     clotrimazole-betamethasone 1-0.05% (LOTRISONE) cream, Apply  topically 2 (two) times daily. (Patient not taking: Reported on 4/30/2025), Disp: 45 g, Rfl: 0    SPRINTEC, 28, 0.25-35 mg-mcg per tablet, Take 1 tablet by mouth once daily, Disp: 84 tablet, Rfl: 0    sucralfate (CARAFATE) 1 gram tablet, Take 1 tablet (1 g total) by mouth after meals as needed (stomach pains). (Patient not taking: Reported on 4/30/2025), Disp: 60 tablet, Rfl: 3    sumatriptan (IMITREX) 25 MG Tab, Take 1 tablet (25 mg total) by mouth every 2 (two) hours as needed (mirgraine). No more than 4 doses in 24 hours, Disp: 30 tablet, Rfl: 0

## 2025-07-15 ENCOUNTER — PATIENT MESSAGE (OUTPATIENT)
Dept: OTHER | Facility: OTHER | Age: 30
End: 2025-07-15
Payer: COMMERCIAL

## 2025-07-18 ENCOUNTER — PROCEDURE VISIT (OUTPATIENT)
Dept: MATERNAL FETAL MEDICINE | Facility: CLINIC | Age: 30
End: 2025-07-18
Payer: COMMERCIAL

## 2025-07-18 DIAGNOSIS — Z3A.17 17 WEEKS GESTATION OF PREGNANCY: ICD-10-CM

## 2025-07-18 PROCEDURE — 76805 OB US >/= 14 WKS SNGL FETUS: CPT | Mod: S$GLB,,, | Performed by: OBSTETRICS & GYNECOLOGY

## 2025-07-22 ENCOUNTER — TELEPHONE (OUTPATIENT)
Dept: OBSTETRICS AND GYNECOLOGY | Facility: CLINIC | Age: 30
End: 2025-07-22
Payer: COMMERCIAL

## 2025-07-23 ENCOUNTER — ROUTINE PRENATAL (OUTPATIENT)
Dept: OBSTETRICS AND GYNECOLOGY | Facility: CLINIC | Age: 30
End: 2025-07-23
Payer: COMMERCIAL

## 2025-07-23 VITALS
SYSTOLIC BLOOD PRESSURE: 115 MMHG | DIASTOLIC BLOOD PRESSURE: 78 MMHG | BODY MASS INDEX: 32.49 KG/M2 | WEIGHT: 201.25 LBS

## 2025-07-23 DIAGNOSIS — Z3A.21 21 WEEKS GESTATION OF PREGNANCY: Primary | ICD-10-CM

## 2025-07-23 PROCEDURE — 0502F SUBSEQUENT PRENATAL CARE: CPT | Mod: CPTII,S$GLB,, | Performed by: STUDENT IN AN ORGANIZED HEALTH CARE EDUCATION/TRAINING PROGRAM

## 2025-07-23 PROCEDURE — 99999 PR PBB SHADOW E&M-EST. PATIENT-LVL III: CPT | Mod: PBBFAC,,, | Performed by: STUDENT IN AN ORGANIZED HEALTH CARE EDUCATION/TRAINING PROGRAM

## 2025-07-23 NOTE — PROGRESS NOTES
Reason for Visit   Routine Prenatal Visit    HPI   29 y.o., at 21w1d by Estimated Date of Delivery: 12/2/25    Patient feels well today, no complaints. BAILEY still persistent      Contractions: No   Bleeding: No   Loss of fluid: No   Fetal movement: yes   Nausea: No   Vomiting: No   Headache: yes     Pregnancy dating, labs, ultrasound reports, prenatal testing, and problem list; prior records and results; and available outside records were reviewed and updated in EMR.      Current Medications[1]   Exam   /78   Wt 91.3 kg (201 lb 4.5 oz)   LMP 02/26/2025 (Exact Date)   BMI 32.49 kg/m²     GENERAL: No acute distress  ABD: Gravid  Fundal height: 21  FHR: 155  SVE: n/a    Assessment and Plan   21 weeks gestation of pregnancy  -     US OB/GYN Procedure ([facility]); Future        - CARLOZ 12/2/25 by 9wk US  - PNLs wnl  - Hgb electrophoresis, Carrier screening: neg  - Aneuploidy screening: discussed, will complete in future if needed based on US findings if indicated but wants to wait for now  - PNV: taking   - ASA: low risk   - MFM US: anatomy WNL incomplete, will schedule follow up   - 1hr GTT/third trimester labs: 28wga  - Tdap: 28wga   - consents: signed 7/23/25     Hx SVT  - was on PRN propranolol  - discussed use in pregnancy safe if needed    Migraines   - can continue tylenol PRN, discussed mag ox supplementation   - imitrex rx use only as needed for severe migraine       Pain and bleeding precautions given  Follow-up: 4 weeks               [1]   Current Outpatient Medications:     promethazine (PHENERGAN) 12.5 MG Tab, Take 1 tablet (12.5 mg total) by mouth every 6 (six) hours as needed (nausea)., Disp: 60 tablet, Rfl: 3    amoxicillin (AMOXIL) 500 MG capsule, Take 500 mg by mouth 3 (three) times daily., Disp: , Rfl:     clotrimazole-betamethasone 1-0.05% (LOTRISONE) cream, Apply topically 2 (two) times daily. (Patient not taking: Reported on 7/23/2025), Disp: 45 g, Rfl: 0    SPRINTEC, 28, 0.25-35 mg-mcg per  tablet, Take 1 tablet by mouth once daily, Disp: 84 tablet, Rfl: 0    sucralfate (CARAFATE) 1 gram tablet, Take 1 tablet (1 g total) by mouth after meals as needed (stomach pains). (Patient not taking: Reported on 7/23/2025), Disp: 60 tablet, Rfl: 3    sumatriptan (IMITREX) 25 MG Tab, Take 1 tablet (25 mg total) by mouth every 2 (two) hours as needed (mirgraine). No more than 4 doses in 24 hours, Disp: 30 tablet, Rfl: 0

## 2025-08-05 ENCOUNTER — TELEPHONE (OUTPATIENT)
Dept: OBSTETRICS AND GYNECOLOGY | Facility: CLINIC | Age: 30
End: 2025-08-05
Payer: COMMERCIAL

## 2025-08-12 ENCOUNTER — PATIENT MESSAGE (OUTPATIENT)
Dept: OBSTETRICS AND GYNECOLOGY | Facility: CLINIC | Age: 30
End: 2025-08-12
Payer: COMMERCIAL

## 2025-08-12 ENCOUNTER — PATIENT MESSAGE (OUTPATIENT)
Dept: OTHER | Facility: OTHER | Age: 30
End: 2025-08-12
Payer: COMMERCIAL

## 2025-08-19 ENCOUNTER — TELEPHONE (OUTPATIENT)
Dept: OBSTETRICS AND GYNECOLOGY | Facility: CLINIC | Age: 30
End: 2025-08-19
Payer: COMMERCIAL

## 2025-08-20 ENCOUNTER — TELEPHONE (OUTPATIENT)
Dept: OBSTETRICS AND GYNECOLOGY | Facility: CLINIC | Age: 30
End: 2025-08-20

## 2025-08-20 ENCOUNTER — PATIENT MESSAGE (OUTPATIENT)
Dept: OBSTETRICS AND GYNECOLOGY | Facility: CLINIC | Age: 30
End: 2025-08-20

## 2025-08-20 ENCOUNTER — ROUTINE PRENATAL (OUTPATIENT)
Dept: OBSTETRICS AND GYNECOLOGY | Facility: CLINIC | Age: 30
End: 2025-08-20
Payer: COMMERCIAL

## 2025-08-20 VITALS
WEIGHT: 212.88 LBS | BODY MASS INDEX: 34.36 KG/M2 | SYSTOLIC BLOOD PRESSURE: 115 MMHG | DIASTOLIC BLOOD PRESSURE: 78 MMHG

## 2025-08-20 DIAGNOSIS — Z3A.25 25 WEEKS GESTATION OF PREGNANCY: Primary | ICD-10-CM

## 2025-08-20 PROCEDURE — 99999 PR PBB SHADOW E&M-EST. PATIENT-LVL III: CPT | Mod: PBBFAC,,, | Performed by: STUDENT IN AN ORGANIZED HEALTH CARE EDUCATION/TRAINING PROGRAM

## 2025-08-20 PROCEDURE — 0502F SUBSEQUENT PRENATAL CARE: CPT | Mod: CPTII,S$GLB,, | Performed by: STUDENT IN AN ORGANIZED HEALTH CARE EDUCATION/TRAINING PROGRAM

## 2025-08-26 ENCOUNTER — PATIENT MESSAGE (OUTPATIENT)
Dept: OTHER | Facility: OTHER | Age: 30
End: 2025-08-26
Payer: COMMERCIAL

## 2025-08-29 ENCOUNTER — PROCEDURE VISIT (OUTPATIENT)
Dept: MATERNAL FETAL MEDICINE | Facility: CLINIC | Age: 30
End: 2025-08-29
Payer: COMMERCIAL

## 2025-08-29 DIAGNOSIS — Z3A.21 21 WEEKS GESTATION OF PREGNANCY: ICD-10-CM

## 2025-08-29 PROCEDURE — 76816 OB US FOLLOW-UP PER FETUS: CPT | Mod: S$GLB,,, | Performed by: OBSTETRICS & GYNECOLOGY
